# Patient Record
Sex: FEMALE | Race: BLACK OR AFRICAN AMERICAN
[De-identification: names, ages, dates, MRNs, and addresses within clinical notes are randomized per-mention and may not be internally consistent; named-entity substitution may affect disease eponyms.]

---

## 2017-01-07 ENCOUNTER — HOSPITAL ENCOUNTER (EMERGENCY)
Dept: HOSPITAL 17 - NEPB | Age: 22
Discharge: HOME | End: 2017-01-07
Payer: SELF-PAY

## 2017-01-07 VITALS
HEART RATE: 77 BPM | SYSTOLIC BLOOD PRESSURE: 105 MMHG | OXYGEN SATURATION: 100 % | DIASTOLIC BLOOD PRESSURE: 72 MMHG | RESPIRATION RATE: 16 BRPM | TEMPERATURE: 97.9 F

## 2017-01-07 VITALS — HEIGHT: 61 IN | WEIGHT: 194.01 LBS | BODY MASS INDEX: 36.63 KG/M2

## 2017-01-07 DIAGNOSIS — Z72.0: ICD-10-CM

## 2017-01-07 DIAGNOSIS — H60.91: Primary | ICD-10-CM

## 2017-01-07 PROCEDURE — 99282 EMERGENCY DEPT VISIT SF MDM: CPT

## 2017-01-07 NOTE — PD
HPI


Chief Complaint:  ENT Complaint


Time Seen by Provider:  13:47


Travel History


International Travel<30 days:  No


Contact w/Intl Traveler<30days:  No


Traveled to known affect area:  No





History of Present Illness


HPI


Patient comes in complaining of right ear pain ongoing for approximately 2 

weeks.  Describes pain as burning like in nature is worse with palpation or 

pressure.  Patient denies any radiation of the pain.  Patient is using over-the-

counter eardrops as well as an ointment near the opening of the auditory canal 

with minimal to no relief of her symptoms.  Denies any fevers, neck pain, 

difficulty swallowing, or known fevers.





PFSH


Past Medical History


Developmental Delay:  No


Diminished Hearing:  No


Immunizations Current:  Yes


Pregnant?:  Pregnant


:  0





Social History


Alcohol Use:  No


Tobacco Use:  Yes


Substance Use:  No





Allergies-Medications


(Allergen,Severity, Reaction):  


Coded Allergies:  


     *MDRO Multi-Drug Resistant Organism (Verified  Allergy, Unknown, 17)


 MRSA 


Reported Meds & Prescriptions





Reported Meds & Active Scripts


Active


Cortisporin HC Otic Drops (Neomycin-Polymyxin-HC Otic Drops) 3.5-10,000-1 Mg-

Units-% Soln 4 Drop RIGHT EAR QID








Review of Systems


Except as stated in HPI:  all other systems reviewed are Neg





Physical Exam


Narrative


GENERAL: Well-developed, overly nourished, in no acute distress, and non-ill 

appearing.


SKIN: Warm and dry.


HEAD: Atraumatic. Normocephalic. 


EYES: Pupils equal and round. EOMI. No scleral icterus. No injection or 

drainage. 


ENT: No nasal bleeding or discharge.  Mucous membranes pink and moist.  Mild 

edema and erythematous to the right auditory canal.  There is reproducible pain 

with tugging of the right auricle and tragus.  There is no foreign body noted.  

Tympanic membranes are pearly gray bilaterally.


NECK: Trachea midline. No cervical lymphadenopathy. Supple.  No nuclear 

rigidity.


RESPIRATORY: No accessory muscle use.  No respiratory distress. 


MUSCULOSKELETAL: No obvious deformities. No clubbing.  No cyanosis.  No edema.  

Full range of motion.


NEUROLOGICAL: Awake and alert. No obvious cranial nerve deficits.  Motor 

grossly within normal limits. Normal speech.


PSYCHIATRIC: Appropriate mood and affect; insight and judgment normal.





Data


Data


Last Documented VS





Vital Signs








  Date Time  Temp Pulse Resp B/P Pulse Ox O2 Delivery O2 Flow Rate FiO2


 


1/7/17 13:23 97.9 77 16 105/72 100 Room Air  











MDM


Medical Decision Making


Medical Screen Exam Complete:  Yes


Emergency Medical Condition:  Yes


Differential Diagnosis


Otitis media, otitis externa, otalgia, other


Narrative Course


The patient presented with ear pain. History and examination revealed evidence 

of otitis externa. There was no significant swelling of the canal nor 

significant debris. No clinical evidence by history or evaluation to suspect 

meningitis and/or sepsis, nor malignant OE or mastoiditis. I discussed with the 

patient, diagnosis, plan of care and to follow up with the patients primary 

physician within the next week. The patient was discharged on otic antibiotic 

drops. The patient was instructed to not swim or submerge head in bath or shower

, or any other activity that would allow water into canal until cleared by 

their physician. The patient was instructed to return if worsens in anyway, 

especially if increased pain, develop fever, worsening headache, neck pain or 

as needed. The patient agreed with plan.





Patient in no obvious distress upon re-evaluation. Patient was asked if they 

wanted to speak to my attending, which the patient did not wish to do at this 

time.  Any questions/concerns in reference to patient diagnosis/condition 

discussed and clarified prior to patient's discharge. Reinforced sheer 

importance of close follow up with patient's primary physician or primary care 

clinic. Instructed patient to return to ED immediately, if symptoms return/

worsen. Pt showed understanding of above instructions.  Further instructions 

and recommendations were detailed in discharge paperwork.  Pt ambulated without 

difficulty out of ED at discharge.





Diagnosis





 Primary Impression:  


 Otitis externa of right ear


 Qualified Code:  H60.501 - Acute otitis externa of right ear, unspecified type


Patient Instructions:  General Instructions, Otitis Externa (ED)





***Additional Instructions:


Follow-up with your primary care physician in 3-5 days for evaluation.  Take 

all medication as prescribed.  Return to the emergency department if symptoms 

get worse.


***Med/Other Pt SpecificInfo:  Prescription(s) given


Scripts


Neomycin-Polymyxin-HC Otic Drops (Cortisporin HC Otic Drops)3.5-10,000-1 Mg-

Units-% Soln4 Drop RIGHT EAR QID  #1 BOTTLE  Ref 0


   Prov:Maddison Parker MD         17


Disposition:  01 DISCHARGE HOME


Condition:  Stable








Anton Gamboa 2017 13:50

## 2017-03-31 ENCOUNTER — HOSPITAL ENCOUNTER (EMERGENCY)
Dept: HOSPITAL 17 - HOBED | Age: 22
Discharge: HOME | End: 2017-03-31
Payer: MEDICAID

## 2017-03-31 DIAGNOSIS — Z3A.23: ICD-10-CM

## 2017-03-31 DIAGNOSIS — O26.892: Primary | ICD-10-CM

## 2017-03-31 PROCEDURE — 87641 MR-STAPH DNA AMP PROBE: CPT

## 2017-03-31 PROCEDURE — 99284 EMERGENCY DEPT VISIT MOD MDM: CPT

## 2017-03-31 NOTE — PD
HPI


Chief Complaint


Decreased fetal movements


Date Seen:  Mar 31, 2017


Time Seen:  09:25 (Oren Marion MD R1)





Travel History


International Travel<30 Days:  No


Contact w/Intl Traveler<30Days:  No


Known Affected Area:  No (Oren Marion MD R1)





History of Present Illness


HPI


Patient is a 21 year old  at 23/5 weeks gestation presents to OB ED with 

the concern of no fetal movements for the past day and a half. She denies any 

LOF or VB. Denies contractions. Denies urinary symptoms. She has no other 

complaints.


:  2


Miscarriage:  1 (Oren Marion MD R1)


HPI


She reports she is now feeling normal fetal movement. (Madalyn Jasmine MD)





History


Past Medical History


Medical History:  Denies Significant Hx (Oren Marion MD R1)





Obstetric History


Obstetric History





First pregnancy resulting in IUFD at 25/6 weeks gestation induced delivery on  (Oren Marion MD R1)





Past Surgical History


Surgical History:  No Previous Surgery (Oren Marion MD R1)





Family History


Family History:  Negative (Oren Marion MD R1)





Social History


Alcohol Use:  No


Tobacco Use:  No


Substance Abuse:  No (Oren Marion MD R1)





Allergies-Medications


(Allergen,Severity, Reaction):  


Coded Allergies:  


     *MDRO Multi-Drug Resistant Organism (Verified  Allergy, Unknown, 3/31/17)


 MRSA 


Home Meds


Active Scripts


Prenatal Vit W/ Ferric Phospha (Vitafol Gummies 3.33-0.333-34.8 mg)1 Chw Chw1 

Chew PO DAILY  #30 BOTTLE  Ref 11


   Prov:Marleny Mehta CNM ARNP         3/28/17


Metronidazole Vaginal Gel (Metrogel Vaginal Gel)0.75 % Gel1 Appl VAGINAL HS  #1 

TUBE  Ref 0


   Prov:Marleny Mehta CNM ARNP         3/28/17


Discontinued Scripts


Neomycin-Polymyxin-HC Otic Drops (Cortisporin HC Otic Drops)3.5-10,000-1 Mg-

Units-% Soln4 Drop RIGHT EAR QID  #1 BOTTLE  Ref 0


   Prov:Maddison Parker MD         17





Review of Systems


Except as stated in HPI:  all other systems reviewed are Neg (Oren Marion MD R1)





Physical Exam


Narrative


GENERAL: Well-nourished, well-developed patient.


SKIN: Warm and dry.


HEAD: Normocephalic and atraumatic.


EYES: No scleral icterus. No injection or drainage. 


ENT: No nasal drainage noted. Mucous membranes pink. Airway patent.


NECK: Supple, trachea midline. No JVD.


CARDIOVASCULAR: Regular rate and rhythm without murmurs, gallops, or rubs. 


RESPIRATORY: Breath sounds equal bilaterally. No accessory muscle use.


ABDOMEN/GI: Abdomen soft, non-tender, bowel sounds present, no rebound, no 

guarding 


   Gravid to 24 weeks size


GENITOURINARY: 


   External Genitalia: [-]


   Cervix: [-]


   Dilatation: [-]          


   Effacement: [-]          


   Station: [-]  


   Presentation: [-]        


   Membranes: [-]


   Uterine Contractions: none


FHT's: 


   Category: I 


   Baseline: 130s   


   Reactive: yes


   Variability: mod 


   Decels: none


EXTREMITIES: No cyanosis or edema.


BACK: Nontender without obvious deformity. No CVA tenderness.


NEUROLOGICAL: Awake and alert. Motor and sensory grossly within normal limits. 

Normal speech. (Oren Marion MD R1)





Data


Data


Vital Signs Reviewed:  Yes (Oren Marion MD R1)





[facility]


Medical Record Reviewed:  Yes


Plan


Patient is a 21 year old  at 23/5 weeks gestation presented with concern 

of decreased fetal movement.





1. IUP


- Category I fetal tracing, reassuring


- No contractions on tocometer


- Encouraged oral hydration


- Advised to follow up at [facility] clinic for continued prenatal care





wdw OB Hospitalist (Oren Marion MD R1)


Plan


patient evaluated with residents, agree with evaluation and plan. patient 

feeling good FM. fetal movement precautions. PTL precautions.  (Madalyn Jasmine MD)





Diagnosis


Diagnosis:  


 Primary Impression:  


 Intrauterine pregnancy


Disposition:   DISCHARGE HOME


Condition:  Stable


Patient Instructions:  General Instructions, Fetal Movement  (ED)








Oren Marion MD R1 Mar 31, 2017 09:40


Madalyn Jasmine MD Mar 31, 2017 12:04

## 2017-05-04 ENCOUNTER — HOSPITAL ENCOUNTER (EMERGENCY)
Dept: HOSPITAL 17 - HOBED | Age: 22
Discharge: HOME | End: 2017-05-04
Payer: MEDICAID

## 2017-05-04 DIAGNOSIS — Z3A.28: ICD-10-CM

## 2017-05-04 DIAGNOSIS — M54.5: ICD-10-CM

## 2017-05-04 DIAGNOSIS — R14.1: ICD-10-CM

## 2017-05-04 DIAGNOSIS — O99.013: Primary | ICD-10-CM

## 2017-05-04 DIAGNOSIS — R11.0: ICD-10-CM

## 2017-05-04 DIAGNOSIS — D64.9: ICD-10-CM

## 2017-05-04 LAB
COLOR UR: (no result)
COMMENT (UR): (no result)
CULTURE IF INDICATED: (no result)
GLUCOSE UR STRIP-MCNC: (no result) MG/DL
HGB UR QL STRIP: (no result)
KETONES UR STRIP-MCNC: (no result) MG/DL
NITRITE UR QL STRIP: (no result)
SP GR UR STRIP: 1.01 (ref 1–1.03)
SQUAMOUS #/AREA URNS HPF: 1 /HPF (ref 0–5)

## 2017-05-04 PROCEDURE — 81001 URINALYSIS AUTO W/SCOPE: CPT

## 2017-05-04 PROCEDURE — 99284 EMERGENCY DEPT VISIT MOD MDM: CPT

## 2017-05-04 NOTE — PD
HPI


Chief Complaint


abdominal pain


Date Seen:  May 4, 2017


Travel History


International Travel<30 Days:  No


Contact w/Intl Traveler<30Days:  No





History of Present Illness


HPI


Ms. Jasmine is a 21 yo  patient of [facility] at 28 4/7 weeks who 

presents with abdominal cramping. 


Patient reports that she began having abdominal pain last night at 

approximately midnight; patient states that this is a diffuse/steady pain, 6-7/

10 in severity, which is cramping in nature. Patient reports that this pain is 

worse in her left lower quadrant. Patient denies fever or chills. She reports 

some nausea this morning which is improved; no vomiting. 2 soft, normal BM this 

morning without presence of blood. Patient also reports cramping pain with 

urination. Patient reports lower back pain. Patient states that she has not 

eaten since the onset of her pain. Patient reports mild headache. She does not 

report chest pain, shortness of breath, leg swelling, or other symptoms. 

Patient states that her pain is gassy in nature. Patient does not report 

vaginal bleeding or loss of vaginal fluid. Patient does not report decreased 

fetal movement.


Patient sees [facility] for prenatal care. Patient states that she has had 

reassuring labs/ultrasound. Patient reports history of anemia for which she 

takes iron supplementation.


:  2


Miscarriage:  1





History


Past Medical History


*** Narrative Medical


Anemia





Obstetric History


Obstetric History





fetal demise at ~20 weeks per patient





Past Surgical History


Surgical History:  No Previous Surgery





Family History


*** Narrative Family History


Sister- DM





Social History


Alcohol Use:  No


Tobacco Use:  No


Substance Abuse:  No





Allergies-Medications


(Allergen,Severity, Reaction):  


Coded Allergies:  


     *MDRO Multi-Drug Resistant Organism (Verified  Allergy, Unknown, 17)


 MRSA 


Home Meds


Active Scripts


Multi-Vit/Iron-Folic Acid-B12-Vit C (Ferralet)90-1-0.012-120 mg Tab1 Tab PO 

DAILY  #60 BOTTLE  Ref 5


   Prov:Marleny Mehta CNM         17


Prenatal Vit W/ Ferric Phospha (Vitafol Gummies 3.33-0.333-34.8 mg)1 Chw Chw1 

Chew PO DAILY  #30 BOTTLE  Ref 11


   Prov:Marleny Mehta CNM         3/28/17





Review of Systems


General / Constitutional:  No: Fever


Eyes:  No: Blurred Vision


HENT:  No: Headaches


Cardiovascular:  No: Chest Pain or Discomfort


Respiratory:  No: Short of Breath


Gastrointestinal:  Nausea, Abdominal Pain


Genitourinary:  Dysuria,  No: Urgency





Physical Exam


HR 81


/67


Narrative


GENERAL: Well-nourished, well-developed patient.


SKIN: Warm and dry.


HEAD: Normocephalic and atraumatic.


EYES: No scleral icterus. No injection or drainage. 


ENT: No nasal drainage noted. Mucous membranes pink. Airway patent.


NECK: Supple, trachea midline. No JVD.


CARDIOVASCULAR: Regular rate and rhythm without murmurs, gallops, or rubs. 


RESPIRATORY: Breath sounds equal bilaterally. No accessory muscle use.


EXTREMITIES: No cyanosis or edema.


BACK: No CVA tenderness.


NEUROLOGICAL: Awake and alert. Motor and sensory function grossly within normal 

limits. 


ABDOMEN/GI: Abdomen soft, non-tender, bowel sounds present, no rebound, no 

guarding 


   Gravid


GENITOURINARY: 


   External Genitalia: intact and normal in appearance


   Cervix: 


   Dilatation: 0         


   Effacement: 0      


   Station: -3 


   Membranes: Intact


   Uterine Contractions: None


FHT's: 


   Category: 1   


   Baseline: 130  


   Reactive: Y   


   Variability: Mod


   Decels: None





Data


Data


Orders





 Vital Signs (Adult) .ON ADMISSION (17 11:18)


^ Labor Status (17 11:18)


Urinalysis - C+S If Indicated (17 11:18)


Ondansetron  Odt (Zofran  Odt) (17 11:30)


Simethicone Chew (Mylicon Chew) (17 11:30)





MDM


Medical Record Reviewed:  Yes


Narrative Course / MDM


21 yo  patient of [facility] at 28 4/7 weeks 


-Abdominal cramping; high suspicion for GI related 


-Dysuria


-Cat 1 rhythm


-No contractions on CTG


-Cervix closed





Plan: 


-Will give simethicone


-Reassured patient regarding nature of pain; advised bland diet


-F/U at [facility] routinely


Diagnosis


Diagnosis:  


 Primary Impression:  


 Abdominal gas pain


 Additional Impression:  


 28 weeks gestation of pregnancy


Disposition:  01 DISCHARGE HOME


Condition:  Stable


Patient Instructions:  Abdominal Pain in Pregnancy  (ED), General Instructions








Ean Collier MD R2 May 4, 2017 11:48

## 2017-06-19 ENCOUNTER — HOSPITAL ENCOUNTER (EMERGENCY)
Dept: HOSPITAL 17 - HOBED | Age: 22
Discharge: HOME | End: 2017-06-19
Payer: MEDICAID

## 2017-06-19 DIAGNOSIS — R10.30: ICD-10-CM

## 2017-06-19 DIAGNOSIS — O23.13: ICD-10-CM

## 2017-06-19 DIAGNOSIS — Z3A.35: ICD-10-CM

## 2017-06-19 DIAGNOSIS — O26.893: Primary | ICD-10-CM

## 2017-06-19 LAB
BACTERIA #/AREA URNS HPF: (no result) /HPF
COLOR UR: YELLOW
COMMENT (UR): (no result)
CULTURE IF INDICATED: (no result)
GLUCOSE UR STRIP-MCNC: (no result) MG/DL
HGB UR QL STRIP: (no result)
KETONES UR STRIP-MCNC: (no result) MG/DL
MUCOUS THREADS #/AREA URNS LPF: (no result) /LPF
NITRITE UR QL STRIP: (no result)
SP GR UR STRIP: 1.02 (ref 1–1.03)
SQUAMOUS #/AREA URNS HPF: 2 /HPF (ref 0–5)

## 2017-06-19 PROCEDURE — 87086 URINE CULTURE/COLONY COUNT: CPT

## 2017-06-19 PROCEDURE — 76816 OB US FOLLOW-UP PER FETUS: CPT

## 2017-06-19 PROCEDURE — 81001 URINALYSIS AUTO W/SCOPE: CPT

## 2017-06-19 PROCEDURE — 76819 FETAL BIOPHYS PROFIL W/O NST: CPT

## 2017-06-19 PROCEDURE — 59025 FETAL NON-STRESS TEST: CPT

## 2017-06-19 NOTE — PD
HPI


Chief Complaint


Contractions


Date Seen:  2017 (Natasha Fitzpatrick MD R2)





Travel History


International Travel<30 Days:  No


Contact w/Intl Traveler<30Days:  No (Natasha Fitzpatrick MD R2)





History of Present Illness


HPI


Patient is 22 year old  at 35-1/7 weeks gestation who presents today for 

contractions.  Contractions and cramping started last night and are occurring 

every 10 minutes.  She denies any vaginal bleeding or discharge. No gush or 

leaking of fluid. Positive fetal movement. Prenatal care with [facility]. (

Natasha Fitzpatrick MD R2)





History


Past Medical History


Medical History:  Denies Significant Hx (Natasha Fitzpatrick MD R2)





Obstetric History


Obstetric History





fetal demise at 25 weeks gestation (Natasha Fitzpatrick MD R2)





Past Surgical History


Surgical History:  No Previous Surgery (Natasha Fitzpatrick MD R2)





Family History


Family History:  Negative (Natasha Fitzpatrick MD R2)





Social History


Alcohol Use:  No


Tobacco Use:  No


Substance Abuse:  No (Natasha Fitzpatrick MD R2)





Allergies-Medications


(Allergen,Severity, Reaction):  


Coded Allergies:  


     *MDRO Multi-Drug Resistant Organism (Verified  Allergy, Unknown, 17)


 MRSA 


Home Meds


Active Scripts


Nitrofurantoin Macrocrystal 100 Mg [id number] Mg PO BID  #14 CAP  Ref 0


   Prov:Natasha Fitzpatrick MD R2         17


Cephalexin (Keflex)250 Mg [id number] Mg PO TID  #21 CAP  Ref 0


   Prov:Madalyn Mcclellan         17


Multi-Vit/Iron-Folic Acid-B12-Vit C (Ferralet)90-1-0.012-120 mg Tab1 Tab PO 

DAILY  #60 BOTTLE  Ref 5


   Prov:Marleny Mehta CNM         17


Prenatal Vit W/ Ferric Phospha (Vitafol Gummies 3.33-0.333-34.8 mg)1 Chw Chw1 

Chew PO DAILY  #30 BOTTLE  Ref 11


   Prov:Marleny Mehta CNM         3/28/17


Discontinued Scripts


Cefadroxil Liq 250 Mg/5 Ml [id number] Mg PO TID  #105 ML  Ref 0


   Prov:Madalyn Mcclellan         17





Review of Systems


Except as stated in HPI:  all other systems reviewed are Neg


General / Constitutional:  No: Fever, Chills


Eyes:  No: Visual changes


HENT:  No: Headaches


Respiratory:  No: Short of Breath


Gastrointestinal:  Abdominal Pain


Genitourinary:  Pelvic Pain,  No: Dysuria, Discharge, Vaginal Bleeding


Musculoskeletal:  No: Edema


Neurologic:  No: Headache


Psychiatric:  No: Substance Abuse (Natasha Fitzpatrick MD R2)





Physical Exam


Narrative


GENERAL: Well-nourished, well-developed patient.


SKIN: Warm and dry.


HEAD: Normocephalic and atraumatic.


EYES: No scleral icterus. No injection or drainage. 


ENT: No nasal drainage noted. Mucous membranes pink. Airway patent.


NECK: Supple, trachea midline. No JVD.


CARDIOVASCULAR: Regular rate and rhythm without murmurs, gallops, or rubs. 


RESPIRATORY: Breath sounds equal bilaterally. No accessory muscle use.


ABDOMEN/GI: Abdomen soft, non-tender, bowel sounds present, no rebound, no 

guarding 


   Gravid to 35 weeks size


GENITOURINARY: 


   External Genitalia: intact and normal in appearance


   BUS glands: normal


   Cervix: posterior


   Dilatation: 3-4          


   Effacement: 50          


   Station: -2  


   Presentation: vertex        


   Membranes: intact


   Uterine Contractions: q3-5min


FHT's: 


   Category: I   


   Baseline: 130   


   Reactive: +   


   Variability: moderate  


   Decels: none  


EXTREMITIES: No cyanosis or edema.


BACK: Nontender without obvious deformity. No CVA tenderness.


NEUROLOGICAL: Awake and alert. Motor and sensory grossly within normal limits. 

Normal speech. (Natasha Fitzpatrick MD R2)





Data


Data


Vital Signs Reviewed:  Yes


Orders





 Vital Signs (Adult) .ON ADMISSION (17 09:59)


^ Labor Status (17 09:59)


^ Non Stress Test (17 09:59)


^ Hydration (17 09:59)


Urinalysis - C+S If Indicated (17 09:59) (Natasha Fitzpatrick MD R2)





MDM


Medical Record Reviewed:  Yes


Narrative Course / MDM


22 year old  at 35-1/7 weeks gestation.





1. IUP- Category I tracing, reassuring.


2. Contractions- Possible  labor, will continue to monitor for cervical 

change. IV LR. 





sdw Dr. Haddox





Addendum:


Variable fetal heart rate deceleration for 45 seconds down to the 60's was 

noted. Otherwise Category I tracing. BPP 8/8. Patient was kept for another 2 

hours of fetal heart tracing and tracing was reassuring with continued Category 

I tracing. 


UA significant for moderate bacteria and leukocyte esterace. Will treat acute 

cystitis with Macrobid 100mg PO BID x 7 days.


Patient is discharge to home. Follow-up with [facility]. (Natasha Fitzpatrick MD R2)





Diagnosis


Diagnosis:  


 Primary Impression:  


  contractions


 Additional Impression:  


 Acute cystitis during pregnancy in third trimester


Disposition:  01 DISCHARGE HOME


Condition:  Stable


Scripts


Nitrofurantoin Macrocrystal 100 Mg [id number] Mg PO BID  #14 CAP  Ref 0


   Prov:Natasha Fitzpatrick MD R2         17





Collaborating MD Comments


Patient seen and examined multiple times today.  Discussed care with patient 

and agree with discharge given the reassuring status of the baby and 8/8 BPP (

Jeannette Winchester MD)








Natasha Fitzpatrick MD R2 2017 10:14


Jeannette Winchester MD 2017 08:29

## 2017-07-06 ENCOUNTER — HOSPITAL ENCOUNTER (INPATIENT)
Dept: HOSPITAL 17 - HOBED | Age: 22
LOS: 3 days | Discharge: HOME | End: 2017-07-09
Attending: OBSTETRICS & GYNECOLOGY | Admitting: OBSTETRICS & GYNECOLOGY
Payer: MEDICAID

## 2017-07-06 VITALS — HEART RATE: 81 BPM | DIASTOLIC BLOOD PRESSURE: 76 MMHG | SYSTOLIC BLOOD PRESSURE: 126 MMHG

## 2017-07-06 VITALS — DIASTOLIC BLOOD PRESSURE: 58 MMHG | HEART RATE: 81 BPM | OXYGEN SATURATION: 100 % | SYSTOLIC BLOOD PRESSURE: 124 MMHG

## 2017-07-06 VITALS — HEART RATE: 74 BPM | SYSTOLIC BLOOD PRESSURE: 140 MMHG | DIASTOLIC BLOOD PRESSURE: 82 MMHG

## 2017-07-06 VITALS — HEART RATE: 97 BPM

## 2017-07-06 VITALS — HEART RATE: 102 BPM | SYSTOLIC BLOOD PRESSURE: 135 MMHG | DIASTOLIC BLOOD PRESSURE: 103 MMHG

## 2017-07-06 VITALS — HEART RATE: 98 BPM | SYSTOLIC BLOOD PRESSURE: 129 MMHG | DIASTOLIC BLOOD PRESSURE: 72 MMHG

## 2017-07-06 VITALS — SYSTOLIC BLOOD PRESSURE: 127 MMHG | HEART RATE: 90 BPM | DIASTOLIC BLOOD PRESSURE: 68 MMHG

## 2017-07-06 VITALS — HEART RATE: 72 BPM | OXYGEN SATURATION: 94 %

## 2017-07-06 VITALS — DIASTOLIC BLOOD PRESSURE: 83 MMHG | HEART RATE: 99 BPM | SYSTOLIC BLOOD PRESSURE: 133 MMHG

## 2017-07-06 VITALS — DIASTOLIC BLOOD PRESSURE: 92 MMHG | HEART RATE: 98 BPM | SYSTOLIC BLOOD PRESSURE: 134 MMHG

## 2017-07-06 VITALS — HEART RATE: 87 BPM

## 2017-07-06 VITALS — SYSTOLIC BLOOD PRESSURE: 109 MMHG | HEART RATE: 97 BPM | DIASTOLIC BLOOD PRESSURE: 41 MMHG

## 2017-07-06 VITALS — RESPIRATION RATE: 16 BRPM | DIASTOLIC BLOOD PRESSURE: 84 MMHG | SYSTOLIC BLOOD PRESSURE: 132 MMHG | HEART RATE: 83 BPM

## 2017-07-06 VITALS — DIASTOLIC BLOOD PRESSURE: 68 MMHG | SYSTOLIC BLOOD PRESSURE: 103 MMHG | HEART RATE: 130 BPM

## 2017-07-06 VITALS — SYSTOLIC BLOOD PRESSURE: 132 MMHG | DIASTOLIC BLOOD PRESSURE: 95 MMHG | HEART RATE: 99 BPM

## 2017-07-06 VITALS — HEART RATE: 77 BPM

## 2017-07-06 VITALS — SYSTOLIC BLOOD PRESSURE: 117 MMHG | DIASTOLIC BLOOD PRESSURE: 72 MMHG | HEART RATE: 84 BPM

## 2017-07-06 VITALS — SYSTOLIC BLOOD PRESSURE: 124 MMHG | DIASTOLIC BLOOD PRESSURE: 74 MMHG | HEART RATE: 73 BPM

## 2017-07-06 VITALS — HEART RATE: 76 BPM | DIASTOLIC BLOOD PRESSURE: 67 MMHG | SYSTOLIC BLOOD PRESSURE: 134 MMHG

## 2017-07-06 VITALS — RESPIRATION RATE: 16 BRPM | HEART RATE: 49 BPM

## 2017-07-06 VITALS — HEART RATE: 86 BPM

## 2017-07-06 VITALS — DIASTOLIC BLOOD PRESSURE: 73 MMHG | SYSTOLIC BLOOD PRESSURE: 116 MMHG | HEART RATE: 75 BPM

## 2017-07-06 VITALS — HEART RATE: 98 BPM

## 2017-07-06 VITALS — DIASTOLIC BLOOD PRESSURE: 64 MMHG | OXYGEN SATURATION: 100 % | SYSTOLIC BLOOD PRESSURE: 114 MMHG | HEART RATE: 86 BPM

## 2017-07-06 VITALS — RESPIRATION RATE: 18 BRPM | TEMPERATURE: 98 F

## 2017-07-06 VITALS — HEART RATE: 95 BPM | SYSTOLIC BLOOD PRESSURE: 131 MMHG | DIASTOLIC BLOOD PRESSURE: 62 MMHG

## 2017-07-06 VITALS — RESPIRATION RATE: 18 BRPM

## 2017-07-06 VITALS — HEART RATE: 92 BPM

## 2017-07-06 VITALS — RESPIRATION RATE: 16 BRPM

## 2017-07-06 VITALS — RESPIRATION RATE: 16 BRPM | HEART RATE: 81 BPM | DIASTOLIC BLOOD PRESSURE: 74 MMHG | SYSTOLIC BLOOD PRESSURE: 121 MMHG

## 2017-07-06 VITALS — OXYGEN SATURATION: 100 % | HEART RATE: 86 BPM

## 2017-07-06 VITALS — TEMPERATURE: 98.3 F | HEART RATE: 84 BPM | OXYGEN SATURATION: 100 %

## 2017-07-06 VITALS — HEART RATE: 89 BPM | OXYGEN SATURATION: 100 %

## 2017-07-06 VITALS — HEART RATE: 90 BPM

## 2017-07-06 VITALS — SYSTOLIC BLOOD PRESSURE: 138 MMHG | DIASTOLIC BLOOD PRESSURE: 73 MMHG | HEART RATE: 98 BPM

## 2017-07-06 VITALS — OXYGEN SATURATION: 100 % | HEART RATE: 88 BPM

## 2017-07-06 VITALS — HEART RATE: 79 BPM | SYSTOLIC BLOOD PRESSURE: 128 MMHG | DIASTOLIC BLOOD PRESSURE: 69 MMHG

## 2017-07-06 VITALS — HEART RATE: 76 BPM

## 2017-07-06 VITALS — HEART RATE: 89 BPM

## 2017-07-06 VITALS — DIASTOLIC BLOOD PRESSURE: 61 MMHG | HEART RATE: 96 BPM | SYSTOLIC BLOOD PRESSURE: 131 MMHG

## 2017-07-06 VITALS — HEART RATE: 81 BPM | OXYGEN SATURATION: 100 %

## 2017-07-06 VITALS — HEART RATE: 93 BPM

## 2017-07-06 DIAGNOSIS — Z3A.37: ICD-10-CM

## 2017-07-06 DIAGNOSIS — R68.84: ICD-10-CM

## 2017-07-06 DIAGNOSIS — M54.5: ICD-10-CM

## 2017-07-06 DIAGNOSIS — O32.6XX0: ICD-10-CM

## 2017-07-06 LAB
BACTERIA #/AREA URNS HPF: (no result) /HPF
BASOPHILS # BLD AUTO: 0 TH/MM3 (ref 0–0.2)
BASOPHILS NFR BLD: 0.2 % (ref 0–2)
COLOR UR: YELLOW
COMMENT (UR): (no result)
CULTURE IF INDICATED: (no result)
EOSINOPHIL # BLD: 0.3 TH/MM3 (ref 0–0.4)
EOSINOPHIL NFR BLD: 2.7 % (ref 0–4)
ERYTHROCYTE [DISTWIDTH] IN BLOOD BY AUTOMATED COUNT: 15.1 % (ref 11.6–17.2)
GLUCOSE UR STRIP-MCNC: (no result) MG/DL
HCT VFR BLD CALC: 36.3 % (ref 35–46)
HEMO FLAGS: (no result)
HGB UR QL STRIP: (no result)
KETONES UR STRIP-MCNC: (no result) MG/DL
LYMPHOCYTES # BLD AUTO: 2.4 TH/MM3 (ref 1–4.8)
LYMPHOCYTES NFR BLD AUTO: 23.3 % (ref 9–44)
MCH RBC QN AUTO: 23.4 PG (ref 27–34)
MCHC RBC AUTO-ENTMCNC: 32.4 % (ref 32–36)
MCV RBC AUTO: 72.4 FL (ref 80–100)
MONOCYTES NFR BLD: 8.7 % (ref 0–8)
MUCOUS THREADS #/AREA URNS LPF: (no result) /LPF
NEUTROPHILS # BLD AUTO: 6.7 TH/MM3 (ref 1.8–7.7)
NEUTROPHILS NFR BLD AUTO: 65.1 % (ref 16–70)
NITRITE UR QL STRIP: (no result)
PLATELET # BLD: 229 TH/MM3 (ref 150–450)
RBC # BLD AUTO: 5.02 MIL/MM3 (ref 4–5.3)
SP GR UR STRIP: 1.02 (ref 1–1.03)
WBC # BLD AUTO: 10.2 TH/MM3 (ref 4–11)

## 2017-07-06 PROCEDURE — 86900 BLOOD TYPING SEROLOGIC ABO: CPT

## 2017-07-06 PROCEDURE — 90707 MMR VACCINE SC: CPT

## 2017-07-06 PROCEDURE — 80307 DRUG TEST PRSMV CHEM ANLYZR: CPT

## 2017-07-06 PROCEDURE — 90715 TDAP VACCINE 7 YRS/> IM: CPT

## 2017-07-06 PROCEDURE — G0481 DRUG TEST DEF 8-14 CLASSES: HCPCS

## 2017-07-06 PROCEDURE — 3E0S3CZ: ICD-10-PCS

## 2017-07-06 PROCEDURE — 86901 BLOOD TYPING SEROLOGIC RH(D): CPT

## 2017-07-06 PROCEDURE — 85025 COMPLETE CBC W/AUTO DIFF WBC: CPT

## 2017-07-06 PROCEDURE — 85461 HEMOGLOBIN FETAL: CPT

## 2017-07-06 PROCEDURE — 99285 EMERGENCY DEPT VISIT HI MDM: CPT

## 2017-07-06 PROCEDURE — 88307 TISSUE EXAM BY PATHOLOGIST: CPT

## 2017-07-06 PROCEDURE — 86850 RBC ANTIBODY SCREEN: CPT

## 2017-07-06 PROCEDURE — 10907ZC DRAINAGE OF AMNIOTIC FLUID, THERAPEUTIC FROM PRODUCTS OF CONCEPTION, VIA NATURAL OR ARTIFICIAL OPENING: ICD-10-PCS | Performed by: OBSTETRICS & GYNECOLOGY

## 2017-07-06 PROCEDURE — 90384 RH IG FULL-DOSE IM: CPT

## 2017-07-06 PROCEDURE — 81001 URINALYSIS AUTO W/SCOPE: CPT

## 2017-07-06 PROCEDURE — 00HU33Z INSERTION OF INFUSION DEVICE INTO SPINAL CANAL, PERCUTANEOUS APPROACH: ICD-10-PCS

## 2017-07-06 RX ADMIN — OXYTOCIN SCH MLS/HR: 10 INJECTION, SOLUTION INTRAMUSCULAR; INTRAVENOUS at 23:30

## 2017-07-06 RX ADMIN — OXYTOCIN SCH MLS/HR: 10 INJECTION, SOLUTION INTRAMUSCULAR; INTRAVENOUS at 15:37

## 2017-07-06 NOTE — PD.LABORPN
Subjective


Subjective


Patient resting comfortably in bed with epidural in place.





Objective


Vital Signs





 Vital Signs








  Date Time  Temp Pulse Resp B/P Pulse Ox O2 Delivery O2 Flow Rate FiO2


 


17 20:36  74  140/82    


 


17 20:30   18     


 


17 20:12  99  132/95    


 


17 19:00   18     


 


17 18:36  84  117/72    


 


17 18:35  102  135/103    


 


17 18:30   18     


 


17 18:30 98.0       


 


17 15:02  73  124/74    


 


17 15:00   18     


 


17 15:00 98.0       








Objective


Pelvic Exam:   


   Cervix: midposition


   Dilatation: 8          


   Effacement: 100          


   Station: -1  


   Presentation: vertex        


   Membranes: AROM, meconium


   Uterine Contractions: q3-4min


FHT's: 


   Category: I   


   Baseline: 120   


   Reactive: +   


   Variability: moderate  


   Decels: none





Assessment/Plan


Assessment and Plan


22 year old  at 37-4/7 weeks gestation.





1. IUP- Category I tracing, reassuring.


2. Labor- cervical change noted, AROM with meconium fluid


3. GBS negative


4. Anticipate 





sdw Dr. Farfan and Natasha Patterson MD R2 2017 22:17

## 2017-07-06 NOTE — PD.LABORPN
Subjective


Subjective


Is 37-38 weeks  who came in abeba this morning at the 10:00, 

membranes intact no bleeding, fetal heart rate tracing is reactive, the time 

was abeba more that she is now.  Contractions spaced out of she's not 

really in pain.  The patient states she had a contraction  7 minutes ago and 

was uncomfortable.  I just checked patient's cervix she's  4/ 40%/ -3 very high

, and this is not unusual just a typical multiparous cervix in the last 3-4 

weeks of pregnancy without significant effacement I do not believe the patient 

is truly in labor and I offered the patient to go home and come back when 

contractions get stronger, however she did not want to do that ,she will  stay 

here in L&D today  and see what happens.  Since she's not even 38 weeks not 

even term I would just leave her alone not try and augment her at this stage, I 

would just give her time to go into labor if she is to do that today and 

tonight in the morning if she still the same she is going home





Objective


Vital Signs





 Vital Signs








  Date Time  Temp Pulse Resp B/P Pulse Ox O2 Delivery O2 Flow Rate FiO2


 


17 11:30  75  116/73    


 


17 11:00   18     








Objective


Pelvic Exam:   


   Cervix: [-]


   Dilatation: [-]          


   Effacement: [-]          


   Station: [-]  


   Presentation: [-]        


   Membranes: [intact or ruptured]


   Uterine Contractions: [-]


FHT's: 


   Category: [-]   


   Baseline: [-]   


   Reactive: [-]   


   Variability: [-]  


   Decels: [-]








Jose Farfan II, MD 2017 13:12

## 2017-07-06 NOTE — PD
HPI


Chief Complaint


Contractions


Date Seen:  2017


Time Seen:  09:50


Travel History


International Travel<30 Days:  No


Contact w/Intl Traveler<30Days:  No





History of Present Illness


HPI


22-year-old  at 37/4 weeks gestation presenting with contractions. 

Contractions started about 6 AM this morning and were about every 5-6 minutes 

apart. They're very intense, but after about an hour they ceased. Then an hour 

after stopping, contractions resumed and were again intense and 5-6 minutes 

apart. Patient was 3-4 cm dilated a few days ago. She denies vaginal bleeding, 

leakage of fluid/rupture of membranes, and she feels normal fetal movement. 

Denies chest pain, shortness of breath, nausea/vomiting, dysuria.





History


Past Medical History


Medical History:  Denies Significant Hx





Obstetric History


Obstetric History


, previous pregnancy delivered vaginally





Past Surgical History


Surgical History:  No Previous Surgery





Family History


Family History:  Negative





Social History


Alcohol Use:  No


Tobacco Use:  No


Substance Abuse:  No





Allergies-Medications


(Allergen,Severity, Reaction):  


Coded Allergies:  


     *MDRO Multi-Drug Resistant Organism (Verified  Allergy, Unknown, 17)


 MRSA 


Home Meds


Active Scripts


Cephalexin (Keflex)250 Mg Mgq921 Mg PO TID  #21 CAP  Ref 0


   Prov:Chappuis,Madalyn B. ARN         17


Multi-Vit/Iron-Folic Acid-B12-Vit C (Ferralet)90-1-0.012-120 mg Tab1 Tab PO 

DAILY  #60 BOTTLE  Ref 5


   Prov:Marleny Mehta CNM [facility]         17


Prenatal Vit W/ Ferric Phospha (Vitafol Gummies 3.33-0.333-34.8 mg)1 Chw Chw1 

Chew PO DAILY  #30 BOTTLE  Ref 11


   Prov:Marleny Mehta CNM [facility]         3/28/17





Review of Systems


Except as stated in HPI:  all other systems reviewed are Neg





Physical Exam


Narrative


GENERAL: Well-nourished, well-developed patient.


SKIN: Warm and dry.


HEAD: Normocephalic and atraumatic.


EYES: No scleral icterus. No injection or drainage. 


ENT: No nasal drainage noted. Mucous membranes pink. Airway patent.


NECK: Supple, trachea midline. No JVD.


CARDIOVASCULAR: Regular rate and rhythm without murmurs, gallops, or rubs. 


RESPIRATORY: Breath sounds equal bilaterally. No accessory muscle use.


BREASTS: Bilateral exam showed no masses , no retractions, no nipple discharge.


ABDOMEN/GI: Abdomen soft, non-tender, bowel sounds present, no rebound, no 

guarding 


GENITOURINARY: 


   Cervix: midposition


   Dilatation: 4-5 cm          


   Effacement: 70%          


   Station: -1  


   Presentation: Vertex        


   Membranes: Intact


   Uterine Contractions: Felt every 4-5 mins, not detected on external monitor


FHT's: 


   Category: 1   


   Baseline: 115   


   Reactive: Y   


   Variability: moderate  


   Decels: N  


EXTREMITIES: No cyanosis or edema.


BACK: Nontender without obvious deformity. No CVA tenderness.


NEUROLOGICAL: Awake and alert. Motor and sensory grossly within normal limits. 

Normal speech.





Data


Data


Vital Signs Reviewed:  Yes


Orders





 Admit To Inpatient (17 )


Vital Signs (Adult) .Per protocol (17 09:57)


Fetal Heart (17 09:57)


Amnioinfusion (17 09:57)


Urinary Catheter Management .ONCE (17 09:57)


Diet Liquid (17 Breakfast)


Lactated Ringer's 1000 Ml Inj (Lr 1000 M (17 09:57)


Lactated Ringer's 1000 Ml Inj (Lr 1000 M (17 09:57)


Sodium Chlorid 0.9% 500 Ml Inj (Ns 500 M (17 10:00)


Sodium Chlor 0.9% 1000 Ml Inj (Ns 1000 M (17 10:17)


Lidocaine 1% Inj (50 Ml) (Xylocaine 1% I (17 10:00)


Citric Acid-Sodium Citrate Liq (Bicitra (17 10:00)


Fentanyl Inj (Fentanyl Inj) (17 10:00)


Fentanyl Inj (Fentanyl Inj) (17 10:00)


Complete Blood Count With Diff (17 09:57)


Hold Clot (17 09:57)


Abo/Rh Blood Type (17 09:57)


Urinalysis - C+S If Indicated (17 09:57)


Resp Oxygen Non Rebreathe Mask (17 )


^ Epidural / Intrathecal Infus (17 09:57)


Oxytocin 30 Units-500ml Premix (Pitocin (17 10:00)


Lidocaine 1% Inj (50 Ml) (Xylocaine 1% I (17 10:00)


Light Mineral Oil (Muri-Lube Oil) (17 10:00)


Inpatient Certification (17 )





MDM


Medical Record Reviewed:  Yes


Narrative Course / MDM


22-year-old  at 37/4 weeks gestation presenting in active labor





#1 IUP


Category 1 tracing, reassuring


- Continuous fetal monitoring





#2 GBS negative


No need for intrapartum antibiotic prophylaxis





#3 labor


Has not ruptured membranes at. Making cervical change.


- Admit to labor and delivery


- Routine antepartum care


- Epidural if patient desires


Diagnosis


Diagnosis:  


 Primary Impression:  


 Normal labor








Charles Jauregui MD R1 2017 10:35

## 2017-07-06 NOTE — HHI.HP
History & Physical


H&P


 HPI 


HPI


Chief Complaint


Contractions


Date Seen:  2017


Time Seen:  09:50


Travel History


International Travel<30 Days:  No


Contact w/Intl Traveler<30Days:  No





History of Present Illness


HPI


22-year-old  at 37/4 weeks gestation presenting with contractions. 

Contractions started about 6 AM this morning and were about every 5-6 minutes 

apart. They're very intense, but after about an hour they ceased. Then an hour 

after stopping, contractions resumed and were again intense and 5-6 minutes 

apart. Patient was 3-4 cm dilated a few days ago. She denies vaginal bleeding, 

leakage of fluid/rupture of membranes, and she feels normal fetal movement. 

Denies chest pain, shortness of breath, nausea/vomiting, dysuria.





 History (Limited) 


History


Past Medical History


Medical History:  Denies Significant Hx





Obstetric History


Obstetric History


, previous pregnancy delivered vaginally





Past Surgical History


Surgical History:  No Previous Surgery





Family History


Family History:  Negative





Social History


Alcohol Use:  No


Tobacco Use:  No


Substance Abuse:  No





 Allergies-Medications 


Allergies-Medications


(Allergen,Severity, Reaction):  


Coded Allergies:  


     *MDRO Multi-Drug Resistant Organism (Verified  Allergy, Unknown, 17)


 MRSA 


Home Meds


Active Scripts


Cephalexin (Keflex)250 Mg Xrt473 Mg PO TID  #21 CAP  Ref 0


   Prov:Madalyn Mcclellan         17


Multi-Vit/Iron-Folic Acid-B12-Vit C (Ferralet)90-1-0.012-120 mg Tab1 Tab PO 

DAILY  #60 BOTTLE  Ref 5


   Prov:Marleny Mehta CNM         17


Prenatal Vit W/ Ferric Phospha (Vitafol Gummies 3.33-0.333-34.8 mg)1 Chw Chw1 

Chew PO DAILY  #30 BOTTLE  Ref 11


   Prov:Marleny Mehta CNM [facility]         3/28/17





 ROS 


Review of Systems


Except as stated in HPI:  all other systems reviewed are Neg





 Physical Exam 


Physical Exam


Narrative


GENERAL: Well-nourished, well-developed patient.


SKIN: Warm and dry.


HEAD: Normocephalic and atraumatic.


EYES: No scleral icterus. No injection or drainage. 


ENT: No nasal drainage noted. Mucous membranes pink. Airway patent.


NECK: Supple, trachea midline. No JVD.


CARDIOVASCULAR: Regular rate and rhythm without murmurs, gallops, or rubs. 


RESPIRATORY: Breath sounds equal bilaterally. No accessory muscle use.


BREASTS: Bilateral exam showed no masses , no retractions, no nipple discharge.


ABDOMEN/GI: Abdomen soft, non-tender, bowel sounds present, no rebound, no 

guarding 


GENITOURINARY: 


   Cervix: midposition


   Dilatation: 4-5 cm          


   Effacement: 70%          


   Station: -1  


   Presentation: Vertex        


   Membranes: Intact


   Uterine Contractions: Felt every 4-5 mins, not detected on external monitor


FHT's: 


   Category: 1   


   Baseline: 115   


   Reactive: Y   


   Variability: moderate  


   Decels: N  


EXTREMITIES: No cyanosis or edema.


BACK: Nontender without obvious deformity. No CVA tenderness.


NEUROLOGICAL: Awake and alert. Motor and sensory grossly within normal limits. 

Normal speech.





 Data 


Vital Signs Reviewed:  Yes


Orders





 Admit To Inpatient (17 )


Vital Signs (Adult) .Per protocol (17 09:57)


Fetal Heart (17 09:57)


Amnioinfusion (17 09:57)


Urinary Catheter Management .ONCE (17 09:57)


Diet Liquid (17 Breakfast)


Lactated Ringer's 1000 Ml Inj (Lr 1000 M (17 09:57)


Lactated Ringer's 1000 Ml Inj (Lr 1000 M (17 09:57)


Sodium Chlorid 0.9% 500 Ml Inj (Ns 500 M (17 10:00)


Sodium Chlor 0.9% 1000 Ml Inj (Ns 1000 M (17 10:17)


Lidocaine 1% Inj (50 Ml) (Xylocaine 1% I (17 10:00)


Citric Acid-Sodium Citrate Liq (Bicitra (17 10:00)


Fentanyl Inj (Fentanyl Inj) (17 10:00)


Fentanyl Inj (Fentanyl Inj) (17 10:00)


Complete Blood Count With Diff (17 09:57)


Hold Clot (17 09:57)


Abo/Rh Blood Type (17 09:57)


Urinalysis - C+S If Indicated (17 09:57)


Resp Oxygen Non Rebreathe Mask (17 )


^ Epidural / Intrathecal Infus (17 09:57)


Oxytocin 30 Units-500ml Premix (Pitocin (17 10:00)


Lidocaine 1% Inj (50 Ml) (Xylocaine 1% I (17 10:00)


Light Mineral Oil (Muri-Lube Oil) (17 10:00)


Inpatient Certification (17 )





 MDM 


MDM


Medical Record Reviewed:  Yes


Narrative Course / MDM


22-year-old  at 37/4 weeks gestation presenting in active labor





#1 IUP


Category 1 tracing, reassuring


- Continuous fetal monitoring





#2 GBS negative


No need for intrapartum antibiotic prophylaxis





#3 labor


Has not ruptured membranes at. Making cervical change.


- Admit to labor and delivery


- Routine antepartum care


- Epidural if patient desires


Diagnosis


Diagnosis:  


 Primary Impression:  


 Normal labor








Charles Jauregui MD R1 2017 10:36

## 2017-07-07 VITALS — SYSTOLIC BLOOD PRESSURE: 126 MMHG | DIASTOLIC BLOOD PRESSURE: 71 MMHG | HEART RATE: 102 BPM

## 2017-07-07 VITALS
DIASTOLIC BLOOD PRESSURE: 77 MMHG | SYSTOLIC BLOOD PRESSURE: 125 MMHG | TEMPERATURE: 98.4 F | HEART RATE: 96 BPM | RESPIRATION RATE: 14 BRPM

## 2017-07-07 VITALS — SYSTOLIC BLOOD PRESSURE: 137 MMHG | DIASTOLIC BLOOD PRESSURE: 76 MMHG | HEART RATE: 100 BPM

## 2017-07-07 VITALS — TEMPERATURE: 98.7 F | RESPIRATION RATE: 18 BRPM

## 2017-07-07 VITALS — HEART RATE: 103 BPM | SYSTOLIC BLOOD PRESSURE: 125 MMHG | DIASTOLIC BLOOD PRESSURE: 66 MMHG

## 2017-07-07 VITALS — HEART RATE: 91 BPM

## 2017-07-07 VITALS — HEART RATE: 92 BPM | SYSTOLIC BLOOD PRESSURE: 112 MMHG | DIASTOLIC BLOOD PRESSURE: 62 MMHG

## 2017-07-07 VITALS — HEART RATE: 129 BPM | RESPIRATION RATE: 18 BRPM

## 2017-07-07 VITALS — RESPIRATION RATE: 16 BRPM

## 2017-07-07 VITALS — DIASTOLIC BLOOD PRESSURE: 67 MMHG | SYSTOLIC BLOOD PRESSURE: 124 MMHG | HEART RATE: 104 BPM | RESPIRATION RATE: 16 BRPM

## 2017-07-07 VITALS — HEART RATE: 100 BPM

## 2017-07-07 VITALS — SYSTOLIC BLOOD PRESSURE: 126 MMHG | DIASTOLIC BLOOD PRESSURE: 69 MMHG | HEART RATE: 100 BPM

## 2017-07-07 VITALS — HEART RATE: 114 BPM | DIASTOLIC BLOOD PRESSURE: 64 MMHG | SYSTOLIC BLOOD PRESSURE: 133 MMHG

## 2017-07-07 VITALS — HEART RATE: 101 BPM | RESPIRATION RATE: 18 BRPM | OXYGEN SATURATION: 100 %

## 2017-07-07 VITALS — HEART RATE: 98 BPM

## 2017-07-07 VITALS — DIASTOLIC BLOOD PRESSURE: 76 MMHG | HEART RATE: 108 BPM | SYSTOLIC BLOOD PRESSURE: 139 MMHG

## 2017-07-07 VITALS — SYSTOLIC BLOOD PRESSURE: 146 MMHG | DIASTOLIC BLOOD PRESSURE: 87 MMHG | HEART RATE: 116 BPM

## 2017-07-07 VITALS — HEART RATE: 107 BPM | RESPIRATION RATE: 18 BRPM

## 2017-07-07 VITALS — HEART RATE: 87 BPM

## 2017-07-07 VITALS — SYSTOLIC BLOOD PRESSURE: 113 MMHG | HEART RATE: 113 BPM | DIASTOLIC BLOOD PRESSURE: 94 MMHG

## 2017-07-07 VITALS — HEART RATE: 104 BPM

## 2017-07-07 VITALS — TEMPERATURE: 98.3 F | RESPIRATION RATE: 18 BRPM

## 2017-07-07 VITALS — HEART RATE: 112 BPM

## 2017-07-07 VITALS — SYSTOLIC BLOOD PRESSURE: 90 MMHG | DIASTOLIC BLOOD PRESSURE: 59 MMHG | HEART RATE: 98 BPM

## 2017-07-07 VITALS — SYSTOLIC BLOOD PRESSURE: 114 MMHG | TEMPERATURE: 97.6 F | DIASTOLIC BLOOD PRESSURE: 79 MMHG

## 2017-07-07 VITALS — SYSTOLIC BLOOD PRESSURE: 123 MMHG | HEART RATE: 98 BPM | DIASTOLIC BLOOD PRESSURE: 72 MMHG

## 2017-07-07 VITALS — RESPIRATION RATE: 18 BRPM

## 2017-07-07 VITALS — HEART RATE: 87 BPM | RESPIRATION RATE: 16 BRPM

## 2017-07-07 VITALS — HEART RATE: 97 BPM

## 2017-07-07 VITALS — HEART RATE: 102 BPM

## 2017-07-07 VITALS — OXYGEN SATURATION: 100 % | HEART RATE: 108 BPM

## 2017-07-07 LAB
AMPHETAMINE, URINE: (no result)
BARBITURATES, URINE: (no result)
COCAINE UR-MCNC: (no result) NG/ML

## 2017-07-07 RX ADMIN — IBUPROFEN PRN MG: 600 TABLET, FILM COATED ORAL at 14:40

## 2017-07-07 RX ADMIN — IBUPROFEN PRN MG: 600 TABLET, FILM COATED ORAL at 21:41

## 2017-07-07 RX ADMIN — ACETAMINOPHEN PRN MG: 325 TABLET ORAL at 14:38

## 2017-07-07 RX ADMIN — IBUPROFEN PRN MG: 600 TABLET, FILM COATED ORAL at 07:59

## 2017-07-07 RX ADMIN — ACETAMINOPHEN PRN MG: 325 TABLET ORAL at 08:00

## 2017-07-07 RX ADMIN — OXYCODONE HYDROCHLORIDE AND ACETAMINOPHEN PRN TAB: 5; 325 TABLET ORAL at 21:42

## 2017-07-07 NOTE — HHI.OB
Subjective


Post Partum Day:  0


Remarks


22 year old female  s/p  at 37/4 wks gestation and is PPD0. AFVSS 

postpartum. Patient reports she is feeling well. Bleeding is decreasing and 

pain is well-controlled. She is breast feeding and bonding well with baby. 

Ambulating without difficulties. She is tolerating a diet without nausea or 

vomiting. She has not had a bowel movement. She has passed gas. Denies chest 

pain, dysuria, shortness of breath, or calf pain.





Objective


Vitals/I&O





Vital Signs








  Date Time  Temp Pulse Resp B/P Pulse Ox O2 Delivery O2 Flow Rate FiO2


 


17 08:30  87 16     


 


17 08:00 97.6   114/79    


 


17 04:05 98.4       


 


17 04:05    125/77    


 


17 04:05  96 14     


 


17 03:18  102  126/71    


 


17 03:16 98.3  18     


 


17 02:45   16     


 


17 02:43  100  126/69    


 


17 02:31  113  113/94    


 


17 02:30   16     


 


17 02:18  98  90/59    


 


17 01:50   18     


 


17 01:50 98.7       


 


17 01:46  108  139/76    


 


17 01:35   18     


 


17 01:30  116  146/87    


 


17 01:25  108      


 


17 01:25     100   


 


17 01:20  101      


 


17 01:20     100   


 


17 01:20   18     


 


17 01:15  112  133/64    


 


17 01:15  114      


 


17 01:05   18     


 


17 01:05  129      


 


17 01:00  101      


 


17 01:00  98  123/72    


 


17 00:55  102      


 


17 00:50  107      


 


17 00:50   18     


 


17 00:45  105      


 


17 00:45  100  137/76    


 


17 00:40  97      


 


17 00:35  104      


 


17 00:30   16     


 


17 00:30  92  124/67    


 


17 00:30  104      


 


17 00:25  91      


 


17 00:20  100      


 


17 00:15  101      


 


17 00:15  103      


 


17 00:15    125/66    


 


17 00:10  98      


 


17 00:05  112      


 


17 00:01  92  112/62    


 


17 00:00  87      


 


17 23:55  98      


 


17 23:55  98      


 


17 23:50  92      


 


17 23:50  92      


 


17 23:45  101  133/83    


 


17 23:45  99      


 


17 23:45    133/83    


 


17 23:45  99      


 


17 23:40  87      


 


17 23:40  87      


 


17 23:36   18     


 


17 23:35  89   100   


 


17 23:30  83      


 


17 23:30  81  124/58 100   


 


17 23:25  87      


 


17 23:20  90      


 


17 23:15  96  128/69    


 


17 23:15  79      


 


17 23:13   18     


 


17 23:10  92      


 


17 23:05  86      


 


17 23:00  81      


 


17 23:00   16     


 


17 23:00  101  121/74    


 


17 22:55     100   


 


17 22:55  81      


 


17 22:50     94   


 


17 22:50  72      


 


17 22:45     100   


 


17 22:45  86  114/64    


 


17 22:45  78      


 


17 22:40  88      


 


17 22:40     100   


 


17 22:35     100   


 


17 22:35  86      


 


17 22:31  97  109/41    


 


17 22:30  84      


 


17 22:30     100   


 


17 22:30 98.3       


 


17 22:25  87      


 


17 22:25  87      


 


17 22:20  87      


 


17 22:20  87      


 


17 22:17   18     


 


17 22:16  130  103/68    


 


17 22:15  89      


 


17 22:10  97      


 


17 22:05  76      


 


17 22:00  83      


 


17 22:00   16     


 


17 22:00  76  132/84    


 


17 21:56  76  134/67    


 


17 21:55  77      


 


17 21:50  96  131/61    


 


17 21:50  95      


 


17 21:45  88      


 


17 21:45  95  131/62    


 


17 21:40  90  127/68    


 


17 21:40  81      


 


17 21:37  81  126/76    


 


17 21:35  98  129/72    


 


17 21:35  79      


 


17 21:31  98  134/92    


 


17 21:30  49      


 


17 21:30   16     


 


17 21:25  93      


 


17 21:20  98  138/73    


 


17 21:15   18     


 


17 21:00   16     


 


17 20:36  74  140/82    


 


17 20:30   18     


 


17 20:12  99  132/95    


 


17 19:00   18     


 


17 18:36  84  117/72    


 


17 18:35  102  135/103    


 


17 18:30   18     


 


17 18:30 98.0       


 


17 15:02  73  124/74    


 


17 15:00   18     


 


17 15:00 98.0       


 


17 11:30  75  116/73    








Objective Remarks


GENERAL: WDWN resting comfortably in bed.


CARDIOVASCULAR: Regular rate and rhythm without murmurs, gallops, or rubs. 


RESPIRATORY: Breath sounds equal bilaterally w/no increased WOB. No accessory 

muscle use.


ABDOMEN/GI: Abdomen soft, appropriately tender. 


   Fundus: Firm, non-tender at umbilicus.


GENITOURINARY: Light to moderate bleeding.


EXTREMITIES: No cyanosis or edema, non-tender, without signs of DVT.


Medications and IVs





 Current Medications








 Medications


  (Trade)  Dose


 Ordered  Sig/Olga


 Route  Start Time


 Stop Time Status Last Admin


 


  (NS Flush)  2 ml  BID


 IV FLUSH  17 09:00


     


 


 


  (NS Flush)  2 ml  UNSCH  PRN


 IV FLUSH  17 02:00


     


 


 


  (Tylenol)  650 mg  Q4H  PRN


 PO  17 02:00


    17 08:00


 


 


  (Motrin)  600 mg  Q6H  PRN


 PO  17 02:00


    17 07:59


 


 


  (Percocet  5-325


 Mg)  1 tab  Q4H  PRN


 PO  17 02:00


     


 


 


  (Percocet  5-325


 Mg)  2 tab  Q4H  PRN


 PO  17 02:00


     


 


 


  (Americaine 20%


 Top Spr)  1 spray  Q4H  PRN


 TOPICAL  17 02:00


     


 


 


  (Tucks Pads)  1 applic  QID  PRN


 TOPICAL  17 02:00


     


 


 


  (Jessica-Colace)  2 tab  Q12H  PRN


 PO  17 02:00


     


 


 


  (Ambien)  5 mg  HS  PRN


 PO  17 02:00


     


 


 


  (M-M-R Ii Inj)  0.5 ml  ONCE ONCE


 SQ  17 16:00


 17 16:01   


 


 


  (Boostrix Inj)  0.5 ml  ONCE ONCE


 IM  17 16:00


 17 16:01   


 


 


  (Mag-Al Plus


 Susp Liq)  15 ml  Q8H  PRN


 PO  17 02:00


     


 


 


  (Zofran  Odt)  4 mg  Q6H  PRN


 PO  17 02:00


     


 











Assessment/Plan


Assessment and Plan


21 yo  female s/p  at 37/4 and is POD0. 


- AFVSS postpartum


- Continue routine postpartum care 


- Motrin and Percocet PRN pain 


- Encourage OOB 


- Pelvic rest x 6 wks


- Contraception: TBD


- Anticipate D/C  or 





Dispo: home


Discharge Planning


home








Dane Mi MD R1 2017 11:25


Dane Mi MD R1 2017 11:25

## 2017-07-07 NOTE — PD.OB.DELI
Delivery Date:  2017


Anesthesia:  Epidural


Episiotomy:  None


Vaginal Delivery:  Normal


Presentation:  Occiput anterior, Compound (compound right hand )


Nuchal Cord:  None


Delayed cord clamping (45 sec):  No


Infant:  Female


One Minute APGAR:  9


Five Minute APGAR:  9


Birth Weight:  2205 g


Placenta:  Spontaneous delivery, Intact, 3 vessel cord


Laceration:  1 deg (right periuretral region )


Additional Information


Estimated blood loss 250 cc, supervised by Dr. Farfan and Maura Dubois MD 2017 01:53

## 2017-07-07 NOTE — HHI.DCPOC
Discharge Care Plan


Diagnosis:  


(1) Normal vaginal delivery


Report Symptoms to Your Doctor


-Temperature above 100.5 degrees


-Redness, of incision or excessive or foul smelling drainage


-Unusual pain or calf pain


-Increased vaginal bleeding


-Painful or difficulty urinating


-Feelings of extreme sadness or anxiety after 2 weeks


Goals to Promote Your Health


* To prevent worsening of your condition and complications


* To maintain your health at the optimal level


Directions to Meet Your Goals


*** Take your medications as prescribed


*** Follow your dietary instruction


*** Follow activity as directed


*** Ensure plenty of rest for recovery


*** Drink fluids for hydration








*** Keep your appointments as scheduled


*** Take your immunizations and boosters as scheduled


*** If your symptoms worsen call your PCP, if no PCP go to Urgent Care Center 

or Emergency Room***


*** Smoking is Dangerous to Your Health. Avoid second hand smoke***


***Call the 24-hour crisis hotline for domestic abuse at 1-427.868.7290***








Dane Mi MD R1 Jul 7, 2017 14:04

## 2017-07-08 VITALS
TEMPERATURE: 97.9 F | HEART RATE: 75 BPM | RESPIRATION RATE: 18 BRPM | SYSTOLIC BLOOD PRESSURE: 113 MMHG | DIASTOLIC BLOOD PRESSURE: 71 MMHG

## 2017-07-08 VITALS — RESPIRATION RATE: 18 BRPM

## 2017-07-08 RX ADMIN — OXYCODONE HYDROCHLORIDE AND ACETAMINOPHEN PRN TAB: 5; 325 TABLET ORAL at 04:11

## 2017-07-08 RX ADMIN — IBUPROFEN PRN MG: 600 TABLET, FILM COATED ORAL at 04:11

## 2017-07-08 RX ADMIN — OXYCODONE HYDROCHLORIDE AND ACETAMINOPHEN PRN TAB: 5; 325 TABLET ORAL at 23:18

## 2017-07-08 RX ADMIN — IBUPROFEN PRN MG: 600 TABLET, FILM COATED ORAL at 17:59

## 2017-07-08 RX ADMIN — IBUPROFEN PRN MG: 600 TABLET, FILM COATED ORAL at 12:30

## 2017-07-08 RX ADMIN — OXYCODONE HYDROCHLORIDE AND ACETAMINOPHEN PRN TAB: 5; 325 TABLET ORAL at 12:30

## 2017-07-08 RX ADMIN — OXYCODONE HYDROCHLORIDE AND ACETAMINOPHEN PRN TAB: 5; 325 TABLET ORAL at 17:59

## 2017-07-08 RX ADMIN — IBUPROFEN PRN MG: 600 TABLET, FILM COATED ORAL at 23:18

## 2017-07-08 RX ADMIN — OXYCODONE HYDROCHLORIDE AND ACETAMINOPHEN PRN TAB: 5; 325 TABLET ORAL at 08:22

## 2017-07-08 NOTE — HHI.OB
Subjective


Post Partum Day:  1


Remarks


Pt seen and examined this morning. Postpartum day # 1 AFVSS overnight.  

Decreased lochia. Denies dysuria. No breast tenderness. She is feeding the baby 

via breast and bottle. Appetite good. No nausea or vomiting. Patient endorses 

no bowel movement, but has positive bowel sounds.  Ambulating well. Denies calf 

pain or shortness of breath. Otherwise, she is doing well this morning and has 

no other concerns.





Objective


Vitals/I&O





Vital Signs








  Date Time  Temp Pulse Resp B/P Pulse Ox O2 Delivery O2 Flow Rate FiO2


 


17 08:20  75  113/71    


 


17 08:20 97.9  18     








Objective Remarks


GENERAL: WDWN resting comfortably in bed.


CARDIOVASCULAR: Regular rate and rhythm without murmurs, gallops, or rubs. 


RESPIRATORY: Breath sounds equal bilaterally w/no increased WOB. No accessory 

muscle use.


ABDOMEN/GI: Abdomen soft, appropriately tender. 


   Fundus: Firm, non-tender at umbilicus.


GENITOURINARY: Light to moderate bleeding.


EXTREMITIES: No cyanosis or edema, non-tender, without signs of DVT.


Medications and IVs





 Current Medications








 Medications


  (Trade)  Dose


 Ordered  Sig/Olga


 Route  Start Time


 Stop Time Status Last Admin


 


  (NS Flush)  2 ml  BID


 IV FLUSH  17 09:00


     


 


 


  (NS Flush)  2 ml  UNSCH  PRN


 IV FLUSH  17 02:00


     


 


 


  (Tylenol)  650 mg  Q4H  PRN


 PO  17 02:00


    17 14:38


 


 


  (Motrin)  600 mg  Q6H  PRN


 PO  17 02:00


    17 04:11


 


 


  (Percocet  5-325


 Mg)  1 tab  Q4H  PRN


 PO  17 02:00


    17 08:22


 


 


  (Percocet  5-325


 Mg)  2 tab  Q4H  PRN


 PO  17 02:00


     


 


 


  (Americaine 20%


 Top Spr)  1 spray  Q4H  PRN


 TOPICAL  17 02:00


     


 


 


  (Tucks Pads)  1 applic  QID  PRN


 TOPICAL  17 02:00


     


 


 


  (Jessica-Colace)  2 tab  Q12H  PRN


 PO  17 02:00


     


 


 


  (Ambien)  5 mg  HS  PRN


 PO  17 02:00


     


 


 


  (Mag-Al Plus


 Susp Liq)  15 ml  Q8H  PRN


 PO  17 02:00


     


 


 


  (Zofran  Odt)  4 mg  Q6H  PRN


 PO  17 02:00


     


 











Assessment/Plan


Assessment and Plan


23y/o female who is postpartum day # 1 s/p . 


-Continue routine postpartum care.


-Motrin PRN pain.


-Encouraged OOB. Advised pelvic rest for 6 wks. 


-Re: birth ctrl, she would like to discuss her options at her follow-up OB 

appointment.


-Anticipate discharge tomorrow, . 


tova Jasmine MD


Discharge Planning


Likely home tomorrow, /








Pa Jordan MD R1 2017 10:33

## 2017-07-09 VITALS
HEART RATE: 81 BPM | RESPIRATION RATE: 18 BRPM | SYSTOLIC BLOOD PRESSURE: 142 MMHG | DIASTOLIC BLOOD PRESSURE: 87 MMHG | TEMPERATURE: 98.4 F

## 2017-07-09 RX ADMIN — IBUPROFEN PRN MG: 600 TABLET, FILM COATED ORAL at 11:40

## 2017-07-09 RX ADMIN — IBUPROFEN PRN MG: 600 TABLET, FILM COATED ORAL at 05:42

## 2017-07-09 RX ADMIN — OXYCODONE HYDROCHLORIDE AND ACETAMINOPHEN PRN TAB: 5; 325 TABLET ORAL at 05:41

## 2017-07-09 NOTE — HHI.OB
Subjective


Post Partum Day:  2


Remarks


Pt seen and examined this morning. Postpartum day # 2 AFVSS overnight.  

Decreased lochia. Denies dysuria. No breast tenderness. She is feeding the baby 

via bottle. Appetite good. No nausea or vomiting. Patient endorses bowel 

movement and passing gas. Ambulating well. Denies calf pain or shortness of 

breath. Currently she states that she is having tenderness in lower back pain 

radiating down both legs related to her epidural placement as well as jaw pain 

related to possible dental abscess per patient report.





Objective


Vitals/I&O





Vital Signs








  Date Time  Temp Pulse Resp B/P Pulse Ox O2 Delivery O2 Flow Rate FiO2


 


17 14:55   18     


 


17 12:30   18     








Objective Remarks


GENERAL: WDWN resting comfortably in bed.


CARDIOVASCULAR: Regular rate and rhythm without murmurs, gallops, or rubs. 


RESPIRATORY: Breath sounds equal bilaterally w/no increased WOB. No accessory 

muscle use.


ABDOMEN/GI: Abdomen soft, appropriately tender. 


   Fundus: Firm, non-tender at umbilicus.


GENITOURINARY: Light to moderate bleeding.


EXTREMITIES: No cyanosis or edema, non-tender, without signs of DVT.


Medications and IVs





 Current Medications








 Medications


  (Trade)  Dose


 Ordered  Sig/Olga


 Route  Start Time


 Stop Time Status Last Admin


 


  (NS Flush)  2 ml  BID


 IV FLUSH  17 09:00


     


 


 


  (NS Flush)  2 ml  UNSCH  PRN


 IV FLUSH  17 02:00


     


 


 


  (Tylenol)  650 mg  Q4H  PRN


 PO  17 02:00


    17 14:38


 


 


  (Motrin)  600 mg  Q6H  PRN


 PO  17 02:00


    17 05:42


 


 


  (Percocet  5-325


 Mg)  1 tab  Q4H  PRN


 PO  17 02:00


    17 05:41


 


 


  (Percocet  5-325


 Mg)  2 tab  Q4H  PRN


 PO  17 02:00


     


 


 


  (Americaine 20%


 Top Spr)  1 spray  Q4H  PRN


 TOPICAL  17 02:00


     


 


 


  (Tucks Pads)  1 applic  QID  PRN


 TOPICAL  17 02:00


     


 


 


  (Jessica-Colace)  2 tab  Q12H  PRN


 PO  17 02:00


     


 


 


  (Ambien)  5 mg  HS  PRN


 PO  17 02:00


     


 


 


  (Mag-Al Plus


 Susp Liq)  15 ml  Q8H  PRN


 PO  17 02:00


     


 


 


  (Zofran  Odt)  4 mg  Q6H  PRN


 PO  17 02:00


     


 











Assessment/Plan


Assessment and Plan


21y/o female who is postpartum day # 2 s/p . 


-Continue routine postpartum care.


-Motrin PRN pain.


-When discussing pain management patient becomes agitated and demands Percocet 

for pain control upon discharge. OB team explained benefits of hydration and 

pain control with Motrin, however patient still demands Percocet and threatens 

to perfuse discharge upon discussion.


-Encouraged OOB. Advised pelvic rest for 6 wks. 


-Re: birth ctrl, she would like to discuss her options at her follow-up OB 

appointment.


-Anticipate discharge today, 


tova Alvarado and Dr. Singh, MDs


Discharge Planning


Patient to be discharged home today, 








Pa Jordan MD R1 2017 10:53

## 2017-10-02 ENCOUNTER — HOSPITAL ENCOUNTER (EMERGENCY)
Dept: HOSPITAL 17 - NEPK | Age: 22
Discharge: HOME | End: 2017-10-02
Payer: SELF-PAY

## 2017-10-02 VITALS
OXYGEN SATURATION: 99 % | DIASTOLIC BLOOD PRESSURE: 68 MMHG | RESPIRATION RATE: 16 BRPM | TEMPERATURE: 98.4 F | HEART RATE: 92 BPM | SYSTOLIC BLOOD PRESSURE: 136 MMHG

## 2017-10-02 DIAGNOSIS — K02.9: Primary | ICD-10-CM

## 2017-10-02 PROCEDURE — 99284 EMERGENCY DEPT VISIT MOD MDM: CPT

## 2017-10-02 NOTE — PD
HPI


Chief Complaint:  Oral / Dental Pain or Problem


Time Seen by Provider:  18:34


Travel History


International Travel<30 days:  No


Contact w/Intl Traveler<30days:  No


Traveled to known affect area:  No





History of Present Illness


HPI


22-year-old female here for evaluation of dental pain.  Symptoms started 3 

weeks ago.  Throbbing pain, constant, localized to the right mandibular first 

molar.  She reports that she's been using over-the-counter Advil and ibuprofen 

which is helped somewhat the pain but it has persisted which prompted 

evaluation.  She endorses tobacco use.  Denies fevers, chills.  She has no 

other complaints at this time.





PFSH


Past Medical History


Developmental Delay:  No


Diminished Hearing:  No


Immunizations Current:  Yes


:  0





Social History


Alcohol Use:  No


Tobacco Use:  No


Substance Use:  No





Allergies-Medications


(Allergen,Severity, Reaction):  


Coded Allergies:  


     *MDRO Multi-Drug Resistant Organism (Verified  Allergy, Unknown, 17)


 MRSA 


Reported Meds & Prescriptions





Reported Meds & Active Scripts


Active


Ibuprofen 800 Mg Tab 800 Mg PO Q6HR PRN


Magic Mouthwash Adult Liq (Multi-Ingredient Mouthwash/Gargle) 120 Ml Susp 10 Ml 

SWISH-SPIT ACHS


     Each 5mL contains: Nystatin 200,000units,


     Diphenhydramine 4.25mg, Viscous Lidocaine 10mg,


     Cherry syrup 0.8 mL


Penicillin V Potassium 500 Mg Tab 500 Mg PO Q8H 7 Days


Xulane 150/35 Patch 7 DAY (Norelgestromin-Ethinyl Estradiol Patch 7 DAY) 150-35 

MCG/24Hr Patch 1 Patch TOPICAL Q7D


     A new patch is applied each week for 3 weeks (21 total days). Week 4


     is patch-free.








Review of Systems


General / Constitutional:  No: Fever, Chills


HENT:  Positive: Dental Difficulties


Respiratory:  No: Cough





Physical Exam


Narrative


GENERAL: Well-developed well-nourished female in no acute distress


SKIN: Warm and dry.


HEAD: Atraumatic. Normocephalic. 


EYES: Pupils equal and round. No scleral icterus. No injection or drainage. 


ENT: No nasal bleeding or discharge.  Mucous membranes pink and moist.  Dental 

decay localized to the right mandibular first molar which is tender to 

palpation.  There is no gingival edema, no facial edema, no sublingual edema, 

no trismus.


NECK: Trachea midline. No JVD.  No lymphadenopathy or submandibular edema.


CARDIOVASCULAR: Regular rate and rhythm.  No murmur appreciated.


RESPIRATORY: No accessory muscle use. Clear to auscultation. Breath sounds 

equal bilaterally.





MDM


Medical Decision Making


Medical Screen Exam Complete:  Yes


Emergency Medical Condition:  Yes


Medical Record Reviewed:  Yes


Differential Diagnosis


Dental caries, pulpitis, pericarditis, periodontal abscess


Narrative Course


Examination is consistent with dental caries.  The patient will be discharged 

with nonnarcotic analgesics and a short course of penicillin.  Recommend follow-

up with a dentist for definitive therapy.





Diagnosis





 Primary Impression:  


 Dental caries





***Additional Instructions:  


Medication as prescribed.  Avoid tobacco products.  Follow-up with a dentist 

for definitive therapy.  Return for any emergent medical conditions.


***Med/Other Pt SpecificInfo:  Prescription(s) given


Scripts


Ibuprofen (Ibuprofen) 800 Mg Tab


800 MG PO Q6HR Y for PAIN, #40 TAB 0 Refills


   Prov: Richie James MD         10/2/17 


Nystatin-Diphenhydramine-Lidocaine Liq (Magic Mouthwash Adult Liq) 120 Ml Susp


10 ML SWISH-SPIT ACHS for Mouth sores, #120 ML 1 Refill


   Each 5mL contains: Nystatin 200,000units,


   Diphenhydramine 4.25mg, Viscous Lidocaine 10mg,


   Cherry syrup 0.8 mL


   Prov: Richie James MD         10/2/17 


Penicillin V Potassium (Penicillin V Potassium) 500 Mg Tab


500 MG PO Q8H for Infection for 7 Days, #21 TAB 0 Refills


   Prov: Richie James MD         10/2/17


Disposition:  01 DISCHARGE HOME


Condition:  Stable











Bhargav Holt Oct 2, 2017 18:39

## 2017-10-02 NOTE — PD
Physical Exam


Time Seen by Provider:  18:21


Narrative


22 year old female presents to the ED for evaluation of R sided dental pain x 3 

weeks. No trauma. no fever or chills. No other symptoms. No significant medical 

history.





Data


Data


Last Documented VS





Vital Signs








  Date Time  Temp Pulse Resp B/P (MAP) Pulse Ox O2 Delivery O2 Flow Rate FiO2


 


10/2/17 18:45        


 


10/2/17 18:45 98.4 92 16  99   











MDM


Medical Record Reviewed:  Yes


Supervised Visit with ADRIÁN:  No


Scripts


Ibuprofen (Ibuprofen) 800 Mg Tab


800 MG PO Q6HR Y for PAIN, #40 TAB 0 Refills


   Prov: Richie James MD         10/2/17 


Nystatin-Diphenhydramine-Lidocaine Liq (Magic Mouthwash Adult Liq) 120 Ml Susp


10 ML SWISH-SPIT ACHS for Mouth sores, #120 ML 1 Refill


   Each 5mL contains: Nystatin 200,000units,


   Diphenhydramine 4.25mg, Viscous Lidocaine 10mg,


   Cherry syrup 0.8 mL


   Prov: Richie James MD         10/2/17 


Penicillin V Potassium (Penicillin V Potassium) 500 Mg Tab


500 MG PO Q8H for Infection for 7 Days, #21 TAB 0 Refills


   Prov: Richie James MD         10/2/17


Condition:  Stable











Mali Palacios Oct 2, 2017 18:21

## 2017-11-12 ENCOUNTER — HOSPITAL ENCOUNTER (EMERGENCY)
Dept: HOSPITAL 17 - NEPD | Age: 22
LOS: 1 days | Discharge: HOME | End: 2017-11-13
Payer: SELF-PAY

## 2017-11-12 VITALS
DIASTOLIC BLOOD PRESSURE: 66 MMHG | SYSTOLIC BLOOD PRESSURE: 145 MMHG | OXYGEN SATURATION: 100 % | TEMPERATURE: 98.3 F | HEART RATE: 83 BPM | RESPIRATION RATE: 16 BRPM

## 2017-11-12 VITALS — HEIGHT: 61 IN | BODY MASS INDEX: 35.38 KG/M2 | WEIGHT: 187.39 LBS

## 2017-11-12 VITALS — OXYGEN SATURATION: 99 % | RESPIRATION RATE: 16 BRPM

## 2017-11-12 DIAGNOSIS — F17.200: ICD-10-CM

## 2017-11-12 DIAGNOSIS — Z3A.09: ICD-10-CM

## 2017-11-12 DIAGNOSIS — O99.331: ICD-10-CM

## 2017-11-12 DIAGNOSIS — R82.71: ICD-10-CM

## 2017-11-12 DIAGNOSIS — O26.891: Primary | ICD-10-CM

## 2017-11-12 DIAGNOSIS — K08.89: ICD-10-CM

## 2017-11-12 DIAGNOSIS — R10.2: ICD-10-CM

## 2017-11-12 LAB
ALP SERPL-CCNC: 86 U/L (ref 45–117)
ALT SERPL-CCNC: 18 U/L (ref 10–53)
ANION GAP SERPL CALC-SCNC: 6 MEQ/L (ref 5–15)
APTT BLD: 29.7 SEC (ref 24.3–30.1)
AST SERPL-CCNC: 11 U/L (ref 15–37)
BACTERIA #/AREA URNS HPF: (no result) /HPF
BASOPHILS # BLD AUTO: 0.1 TH/MM3 (ref 0–0.2)
BASOPHILS NFR BLD: 0.6 % (ref 0–2)
BETA HCG QUANT: (no result) MIU/ML (ref 0–5)
BILIRUB SERPL-MCNC: 0.1 MG/DL (ref 0.2–1)
BUN SERPL-MCNC: 7 MG/DL (ref 7–18)
CHLORIDE SERPL-SCNC: 104 MEQ/L (ref 98–107)
COLOR UR: YELLOW
COMMENT (UR): (no result)
CULTURE IF INDICATED: (no result)
EOSINOPHIL # BLD: 0.2 TH/MM3 (ref 0–0.4)
EOSINOPHIL NFR BLD: 2.4 % (ref 0–4)
ERYTHROCYTE [DISTWIDTH] IN BLOOD BY AUTOMATED COUNT: 14 % (ref 11.6–17.2)
GFR SERPLBLD BASED ON 1.73 SQ M-ARVRAT: 117 ML/MIN (ref 89–?)
GLUCOSE UR STRIP-MCNC: (no result) MG/DL
HCO3 BLD-SCNC: 25.6 MEQ/L (ref 21–32)
HCT VFR BLD CALC: 34.3 % (ref 35–46)
HEMO FLAGS: (no result)
HGB UR QL STRIP: (no result)
INR PPP: 1 RATIO
KETONES UR STRIP-MCNC: (no result) MG/DL
LYMPHOCYTES # BLD AUTO: 3.5 TH/MM3 (ref 1–4.8)
LYMPHOCYTES NFR BLD AUTO: 40.2 % (ref 9–44)
MCH RBC QN AUTO: 24 PG (ref 27–34)
MCHC RBC AUTO-ENTMCNC: 32.9 % (ref 32–36)
MCV RBC AUTO: 73 FL (ref 80–100)
MONOCYTES NFR BLD: 10.8 % (ref 0–8)
MUCOUS THREADS #/AREA URNS LPF: (no result) /LPF
NEUTROPHILS # BLD AUTO: 4 TH/MM3 (ref 1.8–7.7)
NEUTROPHILS NFR BLD AUTO: 46 % (ref 16–70)
NITRITE UR QL STRIP: (no result)
PLATELET # BLD: 282 TH/MM3 (ref 150–450)
POTASSIUM SERPL-SCNC: 3.7 MEQ/L (ref 3.5–5.1)
PROTHROMBIN TIME: 10.6 SEC (ref 9.8–11.6)
RBC # BLD AUTO: 4.7 MIL/MM3 (ref 4–5.3)
SODIUM SERPL-SCNC: 136 MEQ/L (ref 136–145)
SP GR UR STRIP: 1.02 (ref 1–1.03)
SQUAMOUS #/AREA URNS HPF: 3 /HPF (ref 0–5)
WBC # BLD AUTO: 8.6 TH/MM3 (ref 4–11)

## 2017-11-12 PROCEDURE — 84703 CHORIONIC GONADOTROPIN ASSAY: CPT

## 2017-11-12 PROCEDURE — 80053 COMPREHEN METABOLIC PANEL: CPT

## 2017-11-12 PROCEDURE — 85730 THROMBOPLASTIN TIME PARTIAL: CPT

## 2017-11-12 PROCEDURE — 83690 ASSAY OF LIPASE: CPT

## 2017-11-12 PROCEDURE — 76700 US EXAM ABDOM COMPLETE: CPT

## 2017-11-12 PROCEDURE — 84702 CHORIONIC GONADOTROPIN TEST: CPT

## 2017-11-12 PROCEDURE — 64400 NJX AA&/STRD TRIGEMINAL NRV: CPT

## 2017-11-12 PROCEDURE — 85610 PROTHROMBIN TIME: CPT

## 2017-11-12 PROCEDURE — 87086 URINE CULTURE/COLONY COUNT: CPT

## 2017-11-12 PROCEDURE — 81001 URINALYSIS AUTO W/SCOPE: CPT

## 2017-11-12 PROCEDURE — 85025 COMPLETE CBC W/AUTO DIFF WBC: CPT

## 2017-11-12 NOTE — PD
HPI


Chief Complaint:  Abdominal Pain


Time Seen by Provider:  21:52


Travel History


International Travel<30 days:  No


Contact w/Intl Traveler<30days:  No


Traveled to known affect area:  No





History of Present Illness


HPI


22-year-old female here for evaluation of dental pain and abdominal cramping.  

Patient reports having right lower molar dental pain for over a month.  She 

states she was seen in the emergency department last month for the same, 

without improvement in pain.  She has been taking ibuprofen and aspirin for her 

pain, and for the last couple of days has noted intermittent diffuse abdominal 

cramping.  She also tells me that her last menstrual period was a month and a 

half ago and she believes she may be pregnant.  She denies vaginal bleeding or 

discharge.  No fevers or chills.  No nausea or vomiting.  No diarrhea.  No 

urinary symptoms.





PFSH


Past Medical History


Medical History:  Denies Significant Hx


Developmental Delay:  No


Diminished Hearing:  No


Immunizations Current:  Yes


Tetanus Vaccination:  < 5 Years


Pregnant?:  Unknown


LMP:  10/15/2017


:  0





Past Surgical History


Surgical History:  No Previous Surgery





Social History


Alcohol Use:  No


Tobacco Use:  Yes (black n milds)


Substance Use:  No





Allergies-Medications


(Allergen,Severity, Reaction):  


Coded Allergies:  


     *MDRO Multi-Drug Resistant Organism (Verified  Allergy, Unknown, 17)


 MRSA 


Reported Meds & Prescriptions





Reported Meds & Active Scripts


Active


Keflex (Cephalexin) 500 Mg Cap 500 Mg PO Q12H 10 Days








Review of Systems


Except as stated in HPI:  all other systems reviewed are Neg





Physical Exam


Narrative


GENERAL: Well-developed, well-nourished, comfortable, no apparent distress.


SKIN: Focused skin assessment warm/dry.


HEAD: Atraumatic. Normocephalic. 


EYES: Pupils equal and round. No scleral icterus. No injection or drainage. 


ENT: No nasal bleeding or discharge.  Mucous membranes pink and moist.  Poor 

dentition with several dental caries, no fluctuance or induration.  No 

sublingual swelling.  No submental swelling or induration.  No drooling or 

stridor.  Normal phonation.


NECK: Trachea midline. No JVD. 


CARDIOVASCULAR: Regular rate and rhythm.  


RESPIRATORY: No accessory muscle use. Clear to auscultation. Breath sounds 

equal bilaterally. 


GASTROINTESTINAL: Abdomen soft, nondistended.  Mild diffuse tenderness without 

peritoneal signs.  Normal bowel sounds.  No hernias.


MUSCULOSKELETAL: No obvious deformities. No clubbing.  No cyanosis.  No edema. 


NEUROLOGICAL: Awake and alert. No obvious cranial nerve deficits.  Motor 

grossly within normal limits. Normal speech.


PSYCHIATRIC: Appropriate mood and affect; insight and judgment normal.





Data


Data


Last Documented VS





Vital Signs








  Date Time  Temp Pulse Resp B/P (MAP) Pulse Ox O2 Delivery O2 Flow Rate FiO2


 


17 23:26        


 


17 22:22   16  99 Room Air  


 


17 19:08 98.3 83      








Orders





 Orders


Complete Blood Count With Diff (17 21:55)


Comprehensive Metabolic Panel (17 21:55)


Lipase (17 21:55)


Prothrombin Time / Inr (Pt) (17 21:55)


Act Partial Throm Time (Ptt) (17 21:55)


Urinalysis - C+S If Indicated (17 21:55)


Iv Access Insert/Monitor (17 21:55)


Ecg Monitoring (17 21:55)


Oximetry (17 21:55)


Sodium Chloride 0.9% Flush (Ns Flush) (17 22:00)


Ed Urine Pregnancytest Poc (17 21:55)


Us Pelvis (Ques Preg/Ectopic) (17 )


Beta Hcg (Quant/Titer) (17 21:55)


Urine Culture (17 22:10)


Acetamin-Hydrocod 325-5 Mg (Norco  5-325 (17 22:45)


Cephalexin (Keflex) (17 23:00)


Lidocaine 1% Inj (50 Ml) (Xylocaine 1% I (17 23:30)


Ed Discharge Order (17 23:32)





Labs





Laboratory Tests








Test


  17


22:10


 


White Blood Count 8.6 TH/MM3 


 


Red Blood Count 4.70 MIL/MM3 


 


Hemoglobin 11.3 GM/DL 


 


Hematocrit 34.3 % 


 


Mean Corpuscular Volume 73.0 FL 


 


Mean Corpuscular Hemoglobin 24.0 PG 


 


Mean Corpuscular Hemoglobin


Concent 32.9 % 


 


 


Red Cell Distribution Width 14.0 % 


 


Platelet Count 282 TH/MM3 


 


Mean Platelet Volume 8.0 FL 


 


Neutrophils (%) (Auto) 46.0 % 


 


Lymphocytes (%) (Auto) 40.2 % 


 


Monocytes (%) (Auto) 10.8 % 


 


Eosinophils (%) (Auto) 2.4 % 


 


Basophils (%) (Auto) 0.6 % 


 


Neutrophils # (Auto) 4.0 TH/MM3 


 


Lymphocytes # (Auto) 3.5 TH/MM3 


 


Monocytes # (Auto) 0.9 TH/MM3 


 


Eosinophils # (Auto) 0.2 TH/MM3 


 


Basophils # (Auto) 0.1 TH/MM3 


 


CBC Comment DIFF FINAL 


 


Differential Comment  


 


Prothrombin Time 10.6 SEC 


 


Prothromb Time International


Ratio 1.0 RATIO 


 


 


Activated Partial


Thromboplast Time 29.7 SEC 


 


 


Urine Color YELLOW 


 


Urine Turbidity HAZY 


 


Urine pH 6.5 


 


Urine Specific Gravity 1.018 


 


Urine Protein NEG mg/dL 


 


Urine Glucose (UA) NEG mg/dL 


 


Urine Ketones NEG mg/dL 


 


Urine Occult Blood NEG 


 


Urine Nitrite NEG 


 


Urine Bilirubin NEG 


 


Urine Urobilinogen


  LESS THAN 2.0


MG/DL


 


Urine Leukocyte Esterase MOD 


 


Urine RBC 6 /hpf 


 


Urine WBC 12 /hpf 


 


Urine Squamous Epithelial


Cells 3 /hpf 


 


 


Urine Calcium Oxalate Crystals MANY /hpf 


 


Urine Amorphous Sediment RARE 


 


Urine Bacteria RARE /hpf 


 


Urine Mucus FEW /lpf 


 


Microscopic Urinalysis Comment


  CULTURE


INDICATED


 


Blood Urea Nitrogen 7 MG/DL 


 


Creatinine 0.75 MG/DL 


 


Random Glucose 93 MG/DL 


 


Total Protein 7.3 GM/DL 


 


Albumin 3.3 GM/DL 


 


Calcium Level 8.4 MG/DL 


 


Alkaline Phosphatase 86 U/L 


 


Aspartate Amino Transf


(AST/SGOT) 11 U/L 


 


 


Alanine Aminotransferase


(ALT/SGPT) 18 U/L 


 


 


Total Bilirubin 0.1 MG/DL 


 


Sodium Level 136 MEQ/L 


 


Potassium Level 3.7 MEQ/L 


 


Chloride Level 104 MEQ/L 


 


Carbon Dioxide Level 25.6 MEQ/L 


 


Anion Gap 6 MEQ/L 


 


Estimat Glomerular Filtration


Rate 117 ML/MIN 


 


 


Lipase 81 U/L 


 


Human Chorionic Gonadotropin,


Quant [id number] MIU/ML 


 











[facility]


Medical Decision Making


Medical Screen Exam Complete:  Yes


Emergency Medical Condition:  Yes


Differential Diagnosis


Dental infection, dental caries, gastritis, peptic ulcer disease, pancreatitis, 

hepatobiliary disease, appendicitis, pregnancy, ectopic pregnancy, PID, TOA, 

ovarian cyst, UTI, cystitis


Narrative Course


Vital signs show heart rate 83, blood pressure 145/66, pulse ox 100% on room air

, oral temp of 98.3F.





CBC: WBC 8.6, hemoglobin 11.3, hematocrit 34.3, platelets 282.


CMP is unremarkable.





UA:


Hazy urine, moderate leukocyte esterase, 6 rbc's, 12 wbc's, many oxalate 

crystals, rare bacteria, culture indicated.





Pelvic ultrasound:


CONCLUSION:     


Positive intrauterine pregnancy with positive heart tones.





Patient is resting comfortably.  She was made aware of all findings.  There are 

no peritoneal signs on her abdominal exam and I do not believe that there is an 

acute intra-abdominal/surgical process to warrant further imaging at this time.

  Right inferior alveolar nerve block will be performed, and the patient will 

be discharged home with a prescription for Keflex which will help both her 

dental pain/possible infection as well as her bacteriuria in pregnancy.  She 

was advised to follow-up with her OB/GYN physician this week.  She was informed 

on when to return to the emergency department.  She verbalizes understanding 

and agreement with plan.





Procedures


**Procedure Narrative**


Right inferior alveolar nerve block:


1 cc of 1% lidocaine was injected in the area of the right inferior alveolar 

nerve.  The patient experienced immediate relief of dental pain.  Tolerated 

well.  No complications.





Diagnosis





 Primary Impression:  


 Pregnancy


 Qualified Codes:  Z3A.09 - 9 weeks gestation of pregnancy


 Additional Impressions:  


 Pain, dental


 Bacteriuria during pregnancy


Referrals:  


Dentist


3 days





Obstetrician


3 days





***Additional Instructions:  


Follow-up with an OB/GYN physician this week.


Follow-up with a dentist this week.


Take antibiotics as prescribed.


Return to the emergency department for worsening symptoms or any other concerns.


Scripts


Cephalexin (Keflex) 500 Mg Cap


500 MG PO Q12H for Infection for 10 Days, #20 CAP 0 Refills


   Prov: Kodak Freeman MD         17


Disposition:  01 DISCHARGE HOME


Condition:  Stable











Kodak Freeman MD 2017 22:19

## 2017-11-12 NOTE — RADRPT
EXAM DATE/TIME:  2017 22:26 

 

[facility] COMPARISON:     

No previous studies available for comparison.

        

 

 

INDICATIONS :     

Pelvic pain. 

                     

 

LAB(S):     

 

Beta-hC

                     

 

MEDICAL HISTORY :     

Pregnancy.     

 

SURGICAL HISTORY :     

None.     

 

ENCOUNTER:     

Initial

 

ACUITY:     

1 week

 

PAIN SCORE:     

3/10

 

LOCATION:     

Bilateral pelvis 

                     

MEASUREMENTS:     

 

UTERUS:                                  

10.3 x 7.5 x 6.2 cm 

 

ENDOMETRIAL STRIPE:      

>20 mm

 

RIGHT OVARY:                      

3.2 x 2.4 x 2.3 cm 

 

FREE FLUID:                              

none

 

CROWN RUMP LENGTH:     

2.4 = 9 WKS 1 DAYS

 

FHR:                                         

163 BPM

 

FINDINGS:     

 

UTERUS:     

The myometrium has homogeneous echotexture without mass. 5.2 x 2.5 x 4.2 cm gestational sac with a po
sitive intrauterine pregnancy. Crown-rump length of 2.4 cm corresponds with a 9 week one day gestatio
n. There are positive heart tones present. 

 

RIGHT OVARY:     

Ovary contains no mass or significant cystic lesion other than a solitary functional cyst could be th
e corpus luteal cyst.  

 

LEFT OVARY:     

Ovary contains no mass or significant cystic lesion.  

 

MISCELLANEOUS:     

No free fluid.  

 

CONCLUSION:     

Positive intrauterine pregnancy with positive heart tones.

 

 

 

 Aaron Mcelroy MD on 2017 at 23:11           

Board Certified Radiologist.

 This report was verified electronically.

## 2018-01-03 ENCOUNTER — HOSPITAL ENCOUNTER (EMERGENCY)
Dept: HOSPITAL 17 - NEPE | Age: 23
Discharge: HOME | End: 2018-01-03
Payer: SELF-PAY

## 2018-01-03 VITALS
HEART RATE: 95 BPM | SYSTOLIC BLOOD PRESSURE: 128 MMHG | DIASTOLIC BLOOD PRESSURE: 72 MMHG | RESPIRATION RATE: 16 BRPM | TEMPERATURE: 98.9 F | OXYGEN SATURATION: 100 %

## 2018-01-03 VITALS — HEIGHT: 61 IN | BODY MASS INDEX: 35.38 KG/M2 | WEIGHT: 187.39 LBS

## 2018-01-03 DIAGNOSIS — O99.331: ICD-10-CM

## 2018-01-03 DIAGNOSIS — O99.311: ICD-10-CM

## 2018-01-03 DIAGNOSIS — O23.11: Primary | ICD-10-CM

## 2018-01-03 DIAGNOSIS — Z3A.10: ICD-10-CM

## 2018-01-03 LAB
AMORPHOUS SEDIMENT, URINE: (no result)
BACTERIA #/AREA URNS HPF: (no result) /HPF
BASOPHILS # BLD AUTO: 0 TH/MM3 (ref 0–0.2)
BASOPHILS NFR BLD: 0.4 % (ref 0–2)
COLOR UR: YELLOW
EOSINOPHIL # BLD: 0.6 TH/MM3 (ref 0–0.4)
EOSINOPHIL NFR BLD: 4.5 % (ref 0–4)
ERYTHROCYTE [DISTWIDTH] IN BLOOD BY AUTOMATED COUNT: 14.2 % (ref 11.6–17.2)
GLUCOSE UR STRIP-MCNC: (no result) MG/DL
HCT VFR BLD CALC: 32.4 % (ref 35–46)
HGB BLD-MCNC: 10.7 GM/DL (ref 11.6–15.3)
HGB UR QL STRIP: (no result)
KETONES UR STRIP-MCNC: (no result) MG/DL
LYMPHOCYTES # BLD AUTO: 3.5 TH/MM3 (ref 1–4.8)
LYMPHOCYTES NFR BLD AUTO: 27.6 % (ref 9–44)
MCH RBC QN AUTO: 23.9 PG (ref 27–34)
MCHC RBC AUTO-ENTMCNC: 33 % (ref 32–36)
MCV RBC AUTO: 72.3 FL (ref 80–100)
MONOCYTE #: 1.1 TH/MM3 (ref 0–0.9)
MONOCYTES NFR BLD: 8.5 % (ref 0–8)
MUCOUS THREADS #/AREA URNS LPF: (no result) /LPF
NEUTROPHILS # BLD AUTO: 7.4 TH/MM3 (ref 1.8–7.7)
NEUTROPHILS NFR BLD AUTO: 59 % (ref 16–70)
NITRITE UR QL STRIP: (no result)
PLATELET # BLD: 259 TH/MM3 (ref 150–450)
PMV BLD AUTO: 8.3 FL (ref 7–11)
RBC # BLD AUTO: 4.48 MIL/MM3 (ref 4–5.3)
SP GR UR STRIP: 1.02 (ref 1–1.03)
SQUAMOUS #/AREA URNS HPF: 6 /HPF (ref 0–5)
URINE LEUKOCYTE ESTERASE: (no result)
WBC # BLD AUTO: 12.5 TH/MM3 (ref 4–11)

## 2018-01-03 PROCEDURE — 84702 CHORIONIC GONADOTROPIN TEST: CPT

## 2018-01-03 PROCEDURE — 85025 COMPLETE CBC W/AUTO DIFF WBC: CPT

## 2018-01-03 PROCEDURE — 99284 EMERGENCY DEPT VISIT MOD MDM: CPT

## 2018-01-03 PROCEDURE — 84703 CHORIONIC GONADOTROPIN ASSAY: CPT

## 2018-01-03 PROCEDURE — 87086 URINE CULTURE/COLONY COUNT: CPT

## 2018-01-03 PROCEDURE — 81001 URINALYSIS AUTO W/SCOPE: CPT

## 2018-01-03 NOTE — PD
HPI


Chief Complaint:  Abdominal Pain


Time Seen by Provider:  01:42


Travel History


International Travel<30 days:  No


Contact w/Intl Traveler<30days:  No


Traveled to known affect area:  No





History of Present Illness


HPI


This is a 22-year-old  Ab1 who states she had a positive pregnancy test a 

couple weeks ago, presents today with points of tightness and fullness in her 

lower abdomen.  She reports it as a achy like pain.  She denies any vaginal 

bleeding or discharge.  She denies any urinary symptoms other than more 

frequent urination.  She states that she had a positive pregnancy test a few 

weeks ago.  She states that she was not sure how far along she was at that 

time.  She cannot tell me when her last menstrual cycle was.  She does state 

that she's been "partying".  When asked what that meant, she states that she's 

been drinking a lot.  When asked why she is drinking a lot, she reports it 

stems from her brother being killed and she is using alcohol to help deal with 

the pain.  She denies any other drugs of abuse.  There are no other complaints 

time my examination.





PFSH


Past Medical History


Developmental Delay:  No


Diminished Hearing:  No


Immunizations Current:  Yes


Pregnant?:  Pregnant


:  3


Para:  1


Miscarriage:  1





Past Surgical History


Surgical History:  No Previous Surgery





Social History


Alcohol Use:  Yes (DAILY- 1 DRINK/DAY)


Tobacco Use:  Yes (black n milds- 2/DAY)


Substance Use:  No





Allergies-Medications


(Allergen,Severity, Reaction):  


Coded Allergies:  


     *MDRO Multi-Drug Resistant Organism (Verified  Allergy, Unknown, 1/3/18)


 MRSA 


Reported Meds & Prescriptions





Reported Meds & Active Scripts


Active


Prenatal Plus Iron 29-1 mg (Prenatal Vit-Iron Carbonyl) 29 Mg Iron-1 Mg Tab 1 

Tab PO DAILY


Macrobid (Nitrofurantoin Monoh/Nitrofur Macro) 100 Mg Cap 100 Mg PO BID 7 Days


Keflex (Cephalexin) 500 Mg Cap 500 Mg PO Q12H 10 Days








Review of Systems


Except as stated in HPI:  all other systems reviewed are Neg


HENT:  No: Headaches, Neck Pain


Cardiovascular:  No: Chest Pain or Discomfort, Palpitations


Respiratory:  No: Cough, Shortness of Breath


Gastrointestinal:  Positive: Abdominal Pain, No: Nausea, Vomiting


Genitourinary:  Positive: Frequency, No: Dysuria, Flank Pain, Discharge, 

Vaginal Bleeding


Musculoskeletal:  No: Weakness, Pain


Neurologic:  No: Weakness, Dizziness





Physical Exam


Narrative


GENERAL: Well-nourished, well-developed patient.


SKIN: Focused skin assessment warm/dry.


HEAD: Normocephalic/atraumatic.


EYES: No scleral icterus. No injection or drainage. 


NECK: Supple, trachea midline. No JVD or lymphadenopathy.


CARDIOVASCULAR: Regular rate and rhythm without murmurs, gallops, or rubs. 


RESPIRATORY: Breath sounds equal bilaterally. No accessory muscle use.


GASTROINTESTINAL: Abdomen soft, nondistended.  She has subjective suprapubic 

discomfort to deep palpation.  There is no rebound or guarding.


MUSCULOSKELETAL: No cyanosis, or edema. 


NEUROLOGICAL: Awake and alert. Cranial nerves II through XII intact.  Motor 

grossly within normal limits. Five out of 5 muscle strength in all muscle 

groups.  Normal speech.





Data


Data


Last Documented VS





Vital Signs








  Date Time  Temp Pulse Resp B/P (MAP) Pulse Ox O2 Delivery O2 Flow Rate FiO2


 


1/3/18 01:13 98.9 95 16 128/72 (90) 100   








Orders





 Orders


Urinalysis - C+S If Indicated (1/3/18 01:41)


Ed Urine Pregnancytest Poc (1/3/18 01:41)


Urine Culture (1/3/18 01:50)


Complete Blood Count With Diff (1/3/18 02:23)


Beta Hcg (Quant/Titer) (1/3/18 02:23)


Nitrofurantoin Monohyd Macrocr (Macrobid (1/3/18 04:30)





Labs





Laboratory Tests








Test


  1/3/18


01:50 1/3/18


03:14


 


Urine Color YELLOW  


 


Urine Turbidity HAZY  


 


Urine pH 6.0  


 


Urine Specific Gravity 1.025  


 


Urine Protein TRACE mg/dL  


 


Urine Glucose (UA) NEG mg/dL  


 


Urine Ketones NEG mg/dL  


 


Urine Occult Blood NEG  


 


Urine Nitrite NEG  


 


Urine Bilirubin NEG  


 


Urine Urobilinogen 2.0 MG/DL  


 


Urine Leukocyte Esterase LARGE  


 


Urine RBC 15 /hpf  


 


Urine WBC 18 /hpf  


 


Urine Squamous Epithelial


Cells 6 /hpf 


  


 


 


Urine Amorphous Sediment RARE  


 


Urine Bacteria RARE /hpf  


 


Urine Mucus FEW /lpf  


 


Microscopic Urinalysis Comment


  CULTURE


INDICATED 


 


 


White Blood Count  12.5 TH/MM3 


 


Red Blood Count  4.48 MIL/MM3 


 


Hemoglobin  10.7 GM/DL 


 


Hematocrit  32.4 % 


 


Mean Corpuscular Volume  72.3 FL 


 


Mean Corpuscular Hemoglobin  23.9 PG 


 


Mean Corpuscular Hemoglobin


Concent 


  33.0 % 


 


 


Red Cell Distribution Width  14.2 % 


 


Platelet Count  259 TH/MM3 


 


Mean Platelet Volume  8.3 FL 


 


Neutrophils (%) (Auto)  59.0 % 


 


Lymphocytes (%) (Auto)  27.6 % 


 


Monocytes (%) (Auto)  8.5 % 


 


Eosinophils (%) (Auto)  4.5 % 


 


Basophils (%) (Auto)  0.4 % 


 


Neutrophils # (Auto)  7.4 TH/MM3 


 


Lymphocytes # (Auto)  3.5 TH/MM3 


 


Monocytes # (Auto)  1.1 TH/MM3 


 


Eosinophils # (Auto)  0.6 TH/MM3 


 


Basophils # (Auto)  0.0 TH/MM3 


 


CBC Comment  DIFF FINAL 


 


Differential Comment   


 


Human Chorionic Gonadotropin,


Quant 


  [id number] MIU/ML 


 











MDM


Medical Decision Making


Medical Screen Exam Complete:  Yes


Emergency Medical Condition:  Yes


Differential Diagnosis


Intrauterine pregnancy versus ectopic pregnancy versus pelvic infection


Narrative Course


Year-old female who presents with abdominal discomfort.  The patient denies any 

urinary symptoms.  Patient's urinalysis shows evidence of infection.  Cultures 

have been ordered on it.  The patient be given a prescription for Macrobid 7 

days per she's been given her first dose of Macrobid here.  Bedside ultrasound 

by this physician showed an intrauterine pregnancy that appears greater than 10 

weeks.  There is good fetal heart tones.  Heart tones were in the 150s 160s.  

Good fetal movement.  He also be given a prescription for prenatal vitamins.  

She is encouraged to follow up with an OB/GYN of her choice.  She is instructed 

return she'll say worsening discomfort, vaginal pain, or any other reason that 

concerned her.





Diagnosis





 Primary Impression:  


 cystitis


 Additional Impression:  


 Intrauterine pregnancy





***Additional Instructions:  


Return if vaginal bleeding, fevers chills, worsening abdominal pain, or any 

other reason the concerns you.


***Med/Other Pt SpecificInfo:  Prescription(s) given


Scripts


Prenatal Vit-Iron Carbonyl (Prenatal Plus Iron 29-1 mg) 29 Mg Iron-1 Mg Tab


1 TAB PO DAILY for Nutritional Supplement, #30 TAB 0 Refills


   Prov: Don Suresh MD         1/3/18 


Nitrofurantoin Monohydrate Macrocrystals (Macrobid) 100 Mg Cap


100 MG PO BID for Infection for 7 Days, #14 CAP 0 Refills


   Prov: Don Suresh MD         1/3/18


Disposition:   DISCHARGE HOME


Condition:  Stable











Don Suresh MD Harsh 3, 2018 01:44

## 2018-01-18 ENCOUNTER — HOSPITAL ENCOUNTER (EMERGENCY)
Dept: HOSPITAL 17 - NEPC | Age: 23
LOS: 1 days | Discharge: HOME | End: 2018-01-19
Payer: SELF-PAY

## 2018-01-18 VITALS — HEIGHT: 61 IN | WEIGHT: 189.6 LBS | BODY MASS INDEX: 35.8 KG/M2

## 2018-01-18 VITALS
TEMPERATURE: 97.9 F | DIASTOLIC BLOOD PRESSURE: 67 MMHG | RESPIRATION RATE: 20 BRPM | HEART RATE: 93 BPM | OXYGEN SATURATION: 100 % | SYSTOLIC BLOOD PRESSURE: 150 MMHG

## 2018-01-18 DIAGNOSIS — A74.9: ICD-10-CM

## 2018-01-18 DIAGNOSIS — O23.10: Primary | ICD-10-CM

## 2018-01-18 DIAGNOSIS — O98.819: ICD-10-CM

## 2018-01-18 DIAGNOSIS — Z3A.00: ICD-10-CM

## 2018-01-18 PROCEDURE — 87086 URINE CULTURE/COLONY COUNT: CPT

## 2018-01-18 PROCEDURE — 87491 CHLMYD TRACH DNA AMP PROBE: CPT

## 2018-01-18 PROCEDURE — 87210 SMEAR WET MOUNT SALINE/INK: CPT

## 2018-01-18 PROCEDURE — 81001 URINALYSIS AUTO W/SCOPE: CPT

## 2018-01-18 PROCEDURE — 80053 COMPREHEN METABOLIC PANEL: CPT

## 2018-01-18 PROCEDURE — 96372 THER/PROPH/DIAG INJ SC/IM: CPT

## 2018-01-18 PROCEDURE — 87591 N.GONORRHOEAE DNA AMP PROB: CPT

## 2018-01-18 PROCEDURE — 99284 EMERGENCY DEPT VISIT MOD MDM: CPT

## 2018-01-18 PROCEDURE — 85025 COMPLETE CBC W/AUTO DIFF WBC: CPT

## 2018-01-19 LAB
ALBUMIN SERPL-MCNC: 3.1 GM/DL (ref 3.4–5)
ALP SERPL-CCNC: 86 U/L (ref 45–117)
ALT SERPL-CCNC: 14 U/L (ref 10–53)
AMORPHOUS SEDIMENT, URINE: (no result)
AST SERPL-CCNC: 8 U/L (ref 15–37)
BACTERIA #/AREA URNS HPF: (no result) /HPF
BASOPHILS # BLD AUTO: 0 TH/MM3 (ref 0–0.2)
BASOPHILS NFR BLD: 0.5 % (ref 0–2)
BILIRUB SERPL-MCNC: 0.1 MG/DL (ref 0.2–1)
BUN SERPL-MCNC: 7 MG/DL (ref 7–18)
CALCIUM SERPL-MCNC: 8.5 MG/DL (ref 8.5–10.1)
CHLORIDE SERPL-SCNC: 107 MEQ/L (ref 98–107)
COLOR UR: (no result)
CREAT SERPL-MCNC: 0.5 MG/DL (ref 0.5–1)
EOSINOPHIL # BLD: 0.3 TH/MM3 (ref 0–0.4)
EOSINOPHIL NFR BLD: 2.7 % (ref 0–4)
ERYTHROCYTE [DISTWIDTH] IN BLOOD BY AUTOMATED COUNT: 14 % (ref 11.6–17.2)
GFR SERPLBLD BASED ON 1.73 SQ M-ARVRAT: 187 ML/MIN (ref 89–?)
GLUCOSE SERPL-MCNC: 76 MG/DL (ref 74–106)
GLUCOSE UR STRIP-MCNC: (no result) MG/DL
HCO3 BLD-SCNC: 23.3 MEQ/L (ref 21–32)
HCT VFR BLD CALC: 32.1 % (ref 35–46)
HGB BLD-MCNC: 10.6 GM/DL (ref 11.6–15.3)
HGB UR QL STRIP: (no result)
KETONES UR STRIP-MCNC: 10 MG/DL
LYMPHOCYTES # BLD AUTO: 3.3 TH/MM3 (ref 1–4.8)
LYMPHOCYTES NFR BLD AUTO: 31.9 % (ref 9–44)
MCH RBC QN AUTO: 24.2 PG (ref 27–34)
MCHC RBC AUTO-ENTMCNC: 33.1 % (ref 32–36)
MCV RBC AUTO: 72.9 FL (ref 80–100)
MONOCYTE #: 0.8 TH/MM3 (ref 0–0.9)
MONOCYTES NFR BLD: 8 % (ref 0–8)
MUCOUS THREADS #/AREA URNS LPF: (no result) /LPF
NEUTROPHILS # BLD AUTO: 5.9 TH/MM3 (ref 1.8–7.7)
NEUTROPHILS NFR BLD AUTO: 56.9 % (ref 16–70)
NITRITE UR QL STRIP: (no result)
PLATELET # BLD: 245 TH/MM3 (ref 150–450)
PMV BLD AUTO: 8.3 FL (ref 7–11)
PROT SERPL-MCNC: 7.4 GM/DL (ref 6.4–8.2)
RBC # BLD AUTO: 4.4 MIL/MM3 (ref 4–5.3)
SODIUM SERPL-SCNC: 139 MEQ/L (ref 136–145)
SP GR UR STRIP: 1.01 (ref 1–1.03)
SQUAMOUS #/AREA URNS HPF: 5 /HPF (ref 0–5)
URINE LEUKOCYTE ESTERASE: (no result)
WBC # BLD AUTO: 10.4 TH/MM3 (ref 4–11)

## 2018-04-17 ENCOUNTER — HOSPITAL ENCOUNTER (EMERGENCY)
Dept: HOSPITAL 17 - HOBED | Age: 23
Discharge: HOME | End: 2018-04-17
Payer: COMMERCIAL

## 2018-04-17 DIAGNOSIS — B37.3: ICD-10-CM

## 2018-04-17 DIAGNOSIS — O98.813: ICD-10-CM

## 2018-04-17 DIAGNOSIS — Z3A.31: ICD-10-CM

## 2018-04-17 DIAGNOSIS — O09.33: Primary | ICD-10-CM

## 2018-04-17 DIAGNOSIS — R82.71: ICD-10-CM

## 2018-04-17 LAB
BACTERIA #/AREA URNS HPF: (no result) /HPF
BASOPHILS # BLD AUTO: 0 TH/MM3 (ref 0–0.2)
BASOPHILS NFR BLD: 0.4 % (ref 0–2)
COLOR UR: YELLOW
EOSINOPHIL # BLD: 0.3 TH/MM3 (ref 0–0.4)
EOSINOPHIL NFR BLD: 2.1 % (ref 0–4)
ERYTHROCYTE [DISTWIDTH] IN BLOOD BY AUTOMATED COUNT: 12.9 % (ref 11.6–17.2)
GLUCOSE UR STRIP-MCNC: (no result) MG/DL
HCT VFR BLD CALC: 34.4 % (ref 35–46)
HGB BLD-MCNC: 11.2 GM/DL (ref 11.6–15.3)
HGB UR QL STRIP: (no result)
KETONES UR STRIP-MCNC: 10 MG/DL
LYMPHOCYTES # BLD AUTO: 2.9 TH/MM3 (ref 1–4.8)
LYMPHOCYTES NFR BLD AUTO: 24.5 % (ref 9–44)
MCH RBC QN AUTO: 23.5 PG (ref 27–34)
MCHC RBC AUTO-ENTMCNC: 32.6 % (ref 32–36)
MCV RBC AUTO: 72 FL (ref 80–100)
MONOCYTE #: 1 TH/MM3 (ref 0–0.9)
MONOCYTES NFR BLD: 8 % (ref 0–8)
MUCOUS THREADS #/AREA URNS LPF: (no result) /LPF
NEUTROPHILS # BLD AUTO: 7.7 TH/MM3 (ref 1.8–7.7)
NEUTROPHILS NFR BLD AUTO: 65 % (ref 16–70)
NITRITE UR QL STRIP: (no result)
PLATELET # BLD: 284 TH/MM3 (ref 150–450)
PMV BLD AUTO: 8.3 FL (ref 7–11)
RBC # BLD AUTO: 4.78 MIL/MM3 (ref 4–5.3)
SP GR UR STRIP: 1.02 (ref 1–1.03)
SQUAMOUS #/AREA URNS HPF: 2 /HPF (ref 0–5)
URINE LEUKOCYTE ESTERASE: (no result)
WBC # BLD AUTO: 11.9 TH/MM3 (ref 4–11)

## 2018-04-17 PROCEDURE — 87210 SMEAR WET MOUNT SALINE/INK: CPT

## 2018-04-17 PROCEDURE — 80307 DRUG TEST PRSMV CHEM ANLYZR: CPT

## 2018-04-17 PROCEDURE — 76805 OB US >/= 14 WKS SNGL FETUS: CPT

## 2018-04-17 PROCEDURE — 99284 EMERGENCY DEPT VISIT MOD MDM: CPT

## 2018-04-17 PROCEDURE — 80074 ACUTE HEPATITIS PANEL: CPT

## 2018-04-17 PROCEDURE — 86900 BLOOD TYPING SEROLOGIC ABO: CPT

## 2018-04-17 PROCEDURE — 36415 COLL VENOUS BLD VENIPUNCTURE: CPT

## 2018-04-17 PROCEDURE — G0481 DRUG TEST DEF 8-14 CLASSES: HCPCS

## 2018-04-17 PROCEDURE — 86901 BLOOD TYPING SEROLOGIC RH(D): CPT

## 2018-04-17 PROCEDURE — 87491 CHLMYD TRACH DNA AMP PROBE: CPT

## 2018-04-17 PROCEDURE — 86592 SYPHILIS TEST NON-TREP QUAL: CPT

## 2018-04-17 PROCEDURE — 81001 URINALYSIS AUTO W/SCOPE: CPT

## 2018-04-17 PROCEDURE — 86850 RBC ANTIBODY SCREEN: CPT

## 2018-04-17 PROCEDURE — 86703 HIV-1/HIV-2 1 RESULT ANTBDY: CPT

## 2018-04-17 PROCEDURE — 85025 COMPLETE CBC W/AUTO DIFF WBC: CPT

## 2018-04-17 PROCEDURE — 87591 N.GONORRHOEAE DNA AMP PROB: CPT

## 2018-04-17 PROCEDURE — 86762 RUBELLA ANTIBODY: CPT

## 2018-04-17 NOTE — PD
HPI


Chief Complaint


decreased fetal movement, vaginal discharge


Date Seen:  2018


Time Seen:  14:00


Travel History


International Travel<30 Days:  No


Contact w/Intl Traveler<30Days:  No


Known Affected Area:  No





History of Present Illness


HPI


Ms Jasmine is a 22YO  reporting to be at 5.5 months with no PNC who p/w 

report of decreased fetal movement and vaginal discharge. Pt reports her 1st 

pregnancy ended in fetal demise at approx 25 weeks and pt had leakage of fluid 

that she did not know had caused the demise and is worried that she has some 

whitish vaginal discharge and is not sure if she is leaking fluid. Pt states 

her fetus has been butterfly kicking vigorously up until 2 days ago and is 

still kicking but not at baseline. Pt reports no overt LOF or VB, but has had 

some whitish vaginal discharge and is concerned about the possibility of occult 

LOF. Additionally, pt complains of occasional stabbing pain in left flank. Pt 

plans to establish PNC with [facility] but complains the soonest she can get an appt 

is May 15th. Pt has had 3 HAs this week, occasionally dizzy, states she has 

been hydrating, and takes no medications (including no PNV). States she is not 

nauseous but vomited once during this pregnancy about a week ago. States she 

had stabbing CP last night that resolved. Denies SOB, current N/V/D, abdominal 

or DVT leg pain.


Para:  2


:  3





History


Past Medical History


Medical History:  Denies Significant Hx





Obstetric History


Obstetric History








1st pregnancy ended in fetal demise at 22-25 weeks


2nd pregnancy delivered at term via 





Family History


*** Narrative Family History


Sister has DM that began during her pregnancy when she was 18


Father - DM, liver problems





Allergies-Medications


(Allergen,Severity, Reaction):  


Coded Allergies:  


     *MDRO Multi-Drug Resistant Organism (Verified  Allergy, Unknown, 18)


 MRSA 


Home Meds


Active Scripts


Acetaminophen (Tylenol) 325 Mg Tab, 650 MG PO Q6H Y for PAIN SCALE 1 TO 4, #20 

TAB 0 Refills


   Prov:Shital Tee DO         18


Nitrofurantoin Monohydrate Macrocrystals (Macrobid) 100 Mg Cap, 100 MG PO BID 

for Infection for 7 Days, #14 CAP 0 Refills


   Prov:Shital Tee DO         18





Review of Systems


General / Constitutional:  No: Fever, Chills


Eyes:  No: Blurred Vision, Visual changes


HENT:  No: Headaches


Cardiovascular:  Chest Pain or Discomfort (last night had stabbing CP), No: 

Palpitations


Respiratory:  No: Cough, Short of Breath


Gastrointestinal:  Vomiting (1x last week), No: Nausea, Diarrhea, Abdominal Pain

, Constipation


Genitourinary:  Discharge, No: Dysuria, Vaginal Bleeding


Musculoskeletal:  No: Edema


Skin:  No Rash


Neurologic:  Dizziness, Headache, No: Weakness





Physical Exam


T - 98.0 / HR 91 / /65 / RR 16 /


Narrative


GENERAL: Well-nourished, well-developed patient in NAD.


SKIN: Warm and dry. No rash or lesions.


HEAD: Normocephalic and atraumatic.


EYES: No scleral icterus. No injection or drainage. EOMI.


ENT: No nasal drainage noted. Mucous membranes pink. Airway patent.


NECK: Supple, trachea midline. No JVD.


CARDIOVASCULAR: Regular rate and rhythm without murmurs, gallops, or rubs. 


RESPIRATORY: Breath sounds equal bilaterally. No accessory muscle use. NO 

increased WOB.


ABDOMEN/GI: Abdomen soft, non-tender, bowel sounds present, no rebound, no 

guarding 


   Gravid to 30 weeks size


GENITOURINARY: external genitalia normal in appearance; copious white discharge 

in the vagina.


   Cervix: closed


   Dilatation: 0          


   Effacement: -          


   Station: -  


   Presentation: -        


   Membranes: intact


   Uterine Contractions: absent


FHT's: 


   Category: 1   


   Baseline: 145   


   Reactive: yes   


   Variability: moderate  


   Decels: absent  


EXTREMITIES: No cyanosis or edema.


BACK: Nontender without obvious deformity. No CVA tenderness.


NEUROLOGICAL: Awake and alert. Motor and sensory grossly within normal limits. 

Five out of 5 muscle strength in all muscle groups. Normal speech.





Data


Data


Vital Signs Reviewed:  Yes





MDM


Medical Record Reviewed:  Yes


Narrative Course / MDM


22YO  at estimated 31/1 weeks based on OB US in 3rd trimester today () ([facility] 18) with no PNC p/w report of decreased fetal movement and 

whitish vaginal discharge x3 days. Fundal height measured at 30cm; FHT 

reassuring Cat 1, , reactive, moderate, no decels.





1. IUP 


-Monitor and toco


-UA


-Prenatal labs


-STI screen + wet prep


-OB US showing GA 31/1 weeks





Pt seen and dw Dr Santiago


Diagnosis


Diagnosis:  


 Primary Impression:  


 No prenatal care in current pregnancy


 Qualified Codes:  O09.33 - Supervision of pregnancy with insufficient 

 care, third trimester


 Additional Impression:  


 Yeast vaginitis


Disposition:  01 DISCHARGE HOME


Condition:  Stable


Scripts


Prenatal Vit-Iron Carbonyl (Prenatal Plus Iron 29-1 mg) 29 Mg Iron-1 Mg Tab


1 TAB PO DAILY for Nutritional Supplement, #30 TAB 0 Refills


   Prov: Dane Mi MD R1         18 


Miconazole 3 Vaginal Supp (Miconazole 3 Vaginal Supp) 200 Mg Supp


200 MG VAGINAL HS for Infection for 3 Days, #3 SUPP 0 Refills


   Prov: Dane iM MD R1         18











Dane Mi MD R1 2018 14:47

## 2018-04-22 ENCOUNTER — HOSPITAL ENCOUNTER (EMERGENCY)
Dept: HOSPITAL 17 - HOBED | Age: 23
Discharge: HOME | End: 2018-04-22
Payer: COMMERCIAL

## 2018-04-22 DIAGNOSIS — Z3A.31: ICD-10-CM

## 2018-04-22 DIAGNOSIS — O26.893: Primary | ICD-10-CM

## 2018-04-22 PROCEDURE — 99284 EMERGENCY DEPT VISIT MOD MDM: CPT

## 2018-04-22 NOTE — PD
HPI


Chief Complaint


cramping


Date Seen:  2018


Time Seen:  14:06


Travel History


International Travel<30 Days:  No


Contact w/Intl Traveler<30Days:  No


Known Affected Area:  No





History of Present Illness


HPI


pt. is a 25 y/o  @ 31 6/7 weeks present w/ c/o abdominal cramping.  pt. 

states truout the day has had increasing abdominal cramping in particular on 

her left side.  pt. states had small bm this am, voiding w/o diff and no blood 

noted in urine.  +FM, no lof/vb.  denies n/v


Weeks Gestation:  31


Para:  1


:  3





History


Past Medical History


Medical History:  Denies Significant Hx





Obstetric History


Obstetric History


,  x 2, 22 week fetal demise in 2016





Past Surgical History


Surgical History:  No Previous Surgery





Family History


Family History:  Negative





Social History


Alcohol Use:  No


Tobacco Use:  No


Substance Abuse:  No





Allergies-Medications


(Allergen,Severity, Reaction):  


Coded Allergies:  


     *MDRO Multi-Drug Resistant Organism (Verified  Allergy, Unknown, 18)


 MRSA 


Home Meds


Active Scripts


Prenatal Vit-Iron Carbonyl (Prenatal Plus Iron 29-1 mg) 29 Mg Iron-1 Mg Tab, 1 

TAB PO DAILY for Nutritional Supplement, #30 TAB 0 Refills


   Prov:Dane Mi MD R1         18


Miconazole 3 Vaginal Supp (Miconazole 3 Vaginal Supp) 200 Mg Supp, 200 MG 

VAGINAL HS for Infection for 3 Days, #3 SUPP 0 Refills


   Prov:Dane Mi MD R1         18


Acetaminophen (Tylenol) 325 Mg Tab, 650 MG PO Q6H Y for PAIN SCALE 1 TO 4, #20 

TAB 0 Refills


   Prov:Shital Tee DO         18


Nitrofurantoin Monohydrate Macrocrystals (Macrobid) 100 Mg Cap, 100 MG PO BID 

for Infection for 7 Days, #14 CAP 0 Refills


   Prov:Shital Tee DO         18





Review of Systems


Except as stated in HPI:  all other systems reviewed are Neg





Physical Exam


Narrative


GENERAL: Well-nourished, well-developed patient.


SKIN: Warm and dry.


HEAD: Normocephalic and atraumatic.


EYES: No scleral icterus. No injection or drainage. 


ENT: No nasal drainage noted. Mucous membranes pink. Airway patent.


NECK: Supple, trachea midline. No JVD.


CARDIOVASCULAR: Regular rate and rhythm without murmurs, gallops, or rubs. 


RESPIRATORY: Breath sounds equal bilaterally. No accessory muscle use.


BREASTS: Bilateral exam showed no masses , no retractions, no nipple discharge.


ABDOMEN/GI: Abdomen soft, non-tender, bowel sounds present, no rebound, no 

guarding 


   Gravid


GENITOURINARY: 


   External Genitalia: intact and normal in appearance


   Cervix: post


   Dilatation: 1        


   Effacement: 40   


   Station: high


   Uterine Contractions: irreg


FHT's: 


   Category: 1


   Reactive: +  


   Variability: mod 


EXTREMITIES: No cyanosis or edema.


BACK: Nontender without obvious deformity. No CVA tenderness.


NEUROLOGICAL: Awake and alert. Motor and sensory grossly within normal limits. 

Five out of 5 muscle strength in all muscle groups. Normal speech.





Data


Data


Vital Signs Reviewed:  Yes


Orders





 Orders


Acetaminophen (Tylenol) (18 14:15)





[facility]


Medical Record Reviewed:  Yes


Plan


pt. to be d/c to home.  pt. w/o pnc has appointment tomorrow for 1st prenatal 

visit.  condition d/w pt.  all ? answered.  pt. given precautions for return.


Diagnosis


Diagnosis:  


 Primary Impression:  


 Abdominal pain affecting pregnancy


 Additional Impression:  


 31 weeks gestation of pregnancy


Disposition:  01 DISCHARGE HOME











Delano Santiago Jr., MD 2018 14:12

## 2018-05-15 ENCOUNTER — HOSPITAL ENCOUNTER (OUTPATIENT)
Dept: HOSPITAL 17 - HPND | Age: 23
End: 2018-05-15
Attending: OBSTETRICS & GYNECOLOGY
Payer: COMMERCIAL

## 2018-05-15 DIAGNOSIS — O09.293: ICD-10-CM

## 2018-05-15 DIAGNOSIS — O26.843: ICD-10-CM

## 2018-05-15 DIAGNOSIS — O09.33: Primary | ICD-10-CM

## 2018-05-15 PROCEDURE — 76816 OB US FOLLOW-UP PER FETUS: CPT

## 2018-06-16 ENCOUNTER — HOSPITAL ENCOUNTER (INPATIENT)
Dept: HOSPITAL 17 - HOBED | Age: 23
LOS: 4 days | Discharge: HOME | End: 2018-06-20
Attending: OBSTETRICS & GYNECOLOGY | Admitting: OBSTETRICS & GYNECOLOGY
Payer: COMMERCIAL

## 2018-06-16 VITALS — BODY MASS INDEX: 38.71 KG/M2 | HEIGHT: 61 IN | WEIGHT: 205.03 LBS

## 2018-06-16 DIAGNOSIS — F12.90: ICD-10-CM

## 2018-06-16 DIAGNOSIS — Z86.14: ICD-10-CM

## 2018-06-16 DIAGNOSIS — E66.9: ICD-10-CM

## 2018-06-16 DIAGNOSIS — Z83.3: ICD-10-CM

## 2018-06-16 DIAGNOSIS — Z3A.39: ICD-10-CM

## 2018-06-16 DIAGNOSIS — R42: ICD-10-CM

## 2018-06-16 PROCEDURE — 85014 HEMATOCRIT: CPT

## 2018-06-16 PROCEDURE — 88307 TISSUE EXAM BY PATHOLOGIST: CPT

## 2018-06-16 PROCEDURE — G0481 DRUG TEST DEF 8-14 CLASSES: HCPCS

## 2018-06-16 PROCEDURE — 85018 HEMOGLOBIN: CPT

## 2018-06-16 PROCEDURE — 86900 BLOOD TYPING SEROLOGIC ABO: CPT

## 2018-06-16 PROCEDURE — 86920 COMPATIBILITY TEST SPIN: CPT

## 2018-06-16 PROCEDURE — 87086 URINE CULTURE/COLONY COUNT: CPT

## 2018-06-16 PROCEDURE — 59025 FETAL NON-STRESS TEST: CPT

## 2018-06-16 PROCEDURE — 85461 HEMOGLOBIN FETAL: CPT

## 2018-06-16 PROCEDURE — 82805 BLOOD GASES W/O2 SATURATION: CPT

## 2018-06-16 PROCEDURE — 87641 MR-STAPH DNA AMP PROBE: CPT

## 2018-06-16 PROCEDURE — 86850 RBC ANTIBODY SCREEN: CPT

## 2018-06-16 PROCEDURE — 90384 RH IG FULL-DOSE IM: CPT

## 2018-06-16 PROCEDURE — 86901 BLOOD TYPING SEROLOGIC RH(D): CPT

## 2018-06-16 PROCEDURE — 85025 COMPLETE CBC W/AUTO DIFF WBC: CPT

## 2018-06-16 PROCEDURE — 90715 TDAP VACCINE 7 YRS/> IM: CPT

## 2018-06-16 PROCEDURE — 36430 TRANSFUSION BLD/BLD COMPNT: CPT

## 2018-06-16 PROCEDURE — P9016 RBC LEUKOCYTES REDUCED: HCPCS

## 2018-06-16 PROCEDURE — 87070 CULTURE OTHR SPECIMN AEROBIC: CPT

## 2018-06-16 PROCEDURE — 85027 COMPLETE CBC AUTOMATED: CPT

## 2018-06-16 PROCEDURE — 81001 URINALYSIS AUTO W/SCOPE: CPT

## 2018-06-16 PROCEDURE — 80307 DRUG TEST PRSMV CHEM ANLYZR: CPT

## 2018-06-16 NOTE — PD
HPI


Chief Complaint


Contractions


Travel History


International Travel<30 Days:  No


Contact w/Intl Traveler<30Days:  No


Known Affected Area:  No





History of Present Illness


HPI


23-year-old , IUP at 39.2





Prenatal care complicated by obesity, history of PPROM with loss at 22-23 weeks

, history of SGA delivery, trichomonas, late prenatal care, ascus Pap, 

marijuana use





The patient presents complaining of the onset of painful contractions at about 

830 tonight.  She repeat reports they have increased in intensity and frequency 

since that time.  There are no aggravating or alleviating factors and no 

attempted treatments.  She reports that she is feeling extremely painful 

contractions every 10-15 minutes.  She reports good fetal movement.  She denies 

any leaking of fluid or vaginal bleeding.


Weeks Gestation:  39


Para:  1


:  3





History


Past Medical History


*** Narrative Medical


Obesity





Obstetric History


Obstetric History





 1 4 lbs. 15 oz. at 37 weeks


P PROM at 22-23 weeks with demise/stillborn





Past Surgical History


Surgical History:  No Previous Surgery





Family History


*** Narrative Family History


DM, liver disease





Social History


Alcohol Use:  No


Tobacco Use:  No ([place] 2 years ago)


Substance Abuse:  Yes (Lexi)





Allergies-Medications


(Allergen,Severity, Reaction):  


Coded Allergies:  


     *MDRO Multi-Drug Resistant Organism (Verified  Allergy, Unknown, 18)


 MRSA 


     No Known Allergies (Verified  Allergy, Unknown, 18)


Home Meds


Active Scripts


Prenatal Vit-Iron Carbonyl (Prenatal Plus Iron 29-1 mg) 29 Mg Iron-1 Mg Tab, 1 

TAB PO DAILY for Nutritional Supplement, #30 TAB 0 Refills


   Prov:Dane Mi MD R1         18


Reported Medications


Ferrous Sulfate (Ferrous Sulfate) 325 Mg (65 Mg Iron) Tablet, 325 MG PO BIDPC 

for Nutritional Supplement, #60 TAB 0 Refills


   18


Discontinued Scripts


Miconazole 3 Vaginal Supp (Miconazole 3 Vaginal Supp) 200 Mg Supp, 200 MG 

VAGINAL HS for Infection for 3 Days, #3 SUPP 0 Refills


   Prov:Dane Mi MD R1         18


Acetaminophen (Tylenol) 325 Mg Tab, 650 MG PO Q6H Y for PAIN SCALE 1 TO 4, #20 

TAB 0 Refills


   Prov:Shital Tee DO         18


Nitrofurantoin Monohydrate Macrocrystals (Macrobid) 100 Mg Cap, 100 MG PO BID 

for Infection for 7 Days, #14 CAP 0 Refills


   Prov:Shital Tee DO         18





Review of Systems


Except as stated in HPI:  all other systems reviewed are Neg





Physical Exam


Narrative


GENERAL: Well-nourished, well-developed patient.


SKIN: Warm and dry.


HEAD: Normocephalic and atraumatic.


EYES: No scleral icterus. No injection or drainage. 


ENT: No nasal drainage noted. Mucous membranes pink. Airway patent.


NECK: Supple, trachea midline. No JVD.


CARDIOVASCULAR: Regular rate and rhythm without murmurs, gallops, or rubs. 


RESPIRATORY: Breath sounds equal bilaterally. No accessory muscle use.


BREASTS: Bilateral exam showed no masses , no retractions, no nipple discharge.


ABDOMEN/GI: Abdomen soft, non-tender, bowel sounds present, no rebound, no 

guarding 


   Gravid 


GENITOURINARY: 


   External Genitalia: intact and normal in appearance.  Normal BUS.  No 

cervical or vaginal masses noted.  SVE 4-5/80/-2 with a stretchy cervix.  

Physiologic discharge, grossly normal rugate


FHT's: Fetal heart tones are in the


EXTREMITIES: No cyanosis or edema.


BACK: Nontender without obvious deformity. No CVA tenderness.


NEUROLOGICAL/musculoskeletal: Awake and alert. Motor and sensory grossly within 

normal limits.  Grossly normal muscle strength in all muscle groups. Normal 

speech.  Grossly normal range of motion, gait not assessed


Psychiatric: Grossly normal memory and affect





MDM


Plan


Assessment/plan:


1.  IUP at 39.2


2.  Active labor: We will admit the patient for labor at term.  Will manage 

expectantly at this time.  Discussed the risks of  including shoulder 

dystocia which is an obstetrical emergency and has potential risks of the 

 and mother as well as the risks and indications of  section.  

We discussed the risks that include pain, infection, bleeding, injury to other 

organs like bladder/bowel/nerves/vessels, injury to the baby, need for repeat 

operation, need for hysterectomy, need for blood transfusion, wound infection/

breakdown, and other possible complications.  We discussed that this baby is 

likely larger than her previous however per ultrasound report with a normal 

weight.  Ultrasound on 5/15/18 was estimated fetal weight 2407 g, 5 lbs. 4 oz.  

All the patient's questions were answered and we will proceed with admission.


3.  Obesity


4.  History of marijuana use: UDS tonight


5.  GBS negative


6.  history of PPROM with loss at 22-23 weeks


7.  history of SGA delivery, 


8.  trichomonas


9.  late prenatal care


10. ascus Pap


11. Rh negative











Madalyn Jasmine MD 2018 23:52

## 2018-06-17 VITALS — DIASTOLIC BLOOD PRESSURE: 55 MMHG | SYSTOLIC BLOOD PRESSURE: 106 MMHG | HEART RATE: 84 BPM

## 2018-06-17 VITALS
SYSTOLIC BLOOD PRESSURE: 124 MMHG | DIASTOLIC BLOOD PRESSURE: 62 MMHG | HEART RATE: 109 BPM | RESPIRATION RATE: 16 BRPM | TEMPERATURE: 99.3 F

## 2018-06-17 VITALS — HEART RATE: 114 BPM | SYSTOLIC BLOOD PRESSURE: 144 MMHG | DIASTOLIC BLOOD PRESSURE: 75 MMHG

## 2018-06-17 VITALS — HEART RATE: 96 BPM | RESPIRATION RATE: 18 BRPM

## 2018-06-17 VITALS — HEART RATE: 113 BPM

## 2018-06-17 VITALS — RESPIRATION RATE: 16 BRPM | HEART RATE: 88 BPM

## 2018-06-17 VITALS — HEART RATE: 87 BPM | OXYGEN SATURATION: 100 %

## 2018-06-17 VITALS — SYSTOLIC BLOOD PRESSURE: 137 MMHG | DIASTOLIC BLOOD PRESSURE: 93 MMHG | HEART RATE: 94 BPM

## 2018-06-17 VITALS — HEART RATE: 131 BPM | RESPIRATION RATE: 20 BRPM

## 2018-06-17 VITALS — DIASTOLIC BLOOD PRESSURE: 74 MMHG | HEART RATE: 117 BPM | SYSTOLIC BLOOD PRESSURE: 123 MMHG

## 2018-06-17 VITALS — DIASTOLIC BLOOD PRESSURE: 90 MMHG | SYSTOLIC BLOOD PRESSURE: 160 MMHG | HEART RATE: 118 BPM

## 2018-06-17 VITALS — HEART RATE: 89 BPM | OXYGEN SATURATION: 100 %

## 2018-06-17 VITALS — HEART RATE: 109 BPM | DIASTOLIC BLOOD PRESSURE: 49 MMHG | SYSTOLIC BLOOD PRESSURE: 121 MMHG

## 2018-06-17 VITALS — RESPIRATION RATE: 16 BRPM

## 2018-06-17 VITALS — DIASTOLIC BLOOD PRESSURE: 71 MMHG | SYSTOLIC BLOOD PRESSURE: 155 MMHG | HEART RATE: 131 BPM

## 2018-06-17 VITALS — RESPIRATION RATE: 18 BRPM | HEART RATE: 114 BPM

## 2018-06-17 VITALS — OXYGEN SATURATION: 100 % | HEART RATE: 132 BPM

## 2018-06-17 VITALS — SYSTOLIC BLOOD PRESSURE: 120 MMHG | DIASTOLIC BLOOD PRESSURE: 65 MMHG | HEART RATE: 99 BPM

## 2018-06-17 VITALS — HEART RATE: 125 BPM

## 2018-06-17 VITALS — SYSTOLIC BLOOD PRESSURE: 150 MMHG | HEART RATE: 104 BPM | DIASTOLIC BLOOD PRESSURE: 80 MMHG

## 2018-06-17 VITALS — SYSTOLIC BLOOD PRESSURE: 105 MMHG | HEART RATE: 103 BPM | DIASTOLIC BLOOD PRESSURE: 68 MMHG

## 2018-06-17 VITALS — HEART RATE: 90 BPM

## 2018-06-17 VITALS — HEART RATE: 98 BPM

## 2018-06-17 VITALS — DIASTOLIC BLOOD PRESSURE: 68 MMHG | SYSTOLIC BLOOD PRESSURE: 149 MMHG | HEART RATE: 114 BPM

## 2018-06-17 VITALS — HEART RATE: 93 BPM

## 2018-06-17 VITALS — SYSTOLIC BLOOD PRESSURE: 144 MMHG | DIASTOLIC BLOOD PRESSURE: 91 MMHG | HEART RATE: 133 BPM

## 2018-06-17 VITALS
DIASTOLIC BLOOD PRESSURE: 58 MMHG | RESPIRATION RATE: 18 BRPM | HEART RATE: 111 BPM | SYSTOLIC BLOOD PRESSURE: 114 MMHG | OXYGEN SATURATION: 100 %

## 2018-06-17 VITALS — HEART RATE: 79 BPM

## 2018-06-17 VITALS — HEART RATE: 83 BPM | OXYGEN SATURATION: 100 % | RESPIRATION RATE: 18 BRPM

## 2018-06-17 VITALS — HEART RATE: 108 BPM

## 2018-06-17 VITALS — RESPIRATION RATE: 18 BRPM | TEMPERATURE: 98.2 F | OXYGEN SATURATION: 100 % | HEART RATE: 99 BPM

## 2018-06-17 VITALS — HEART RATE: 129 BPM | SYSTOLIC BLOOD PRESSURE: 144 MMHG | DIASTOLIC BLOOD PRESSURE: 53 MMHG

## 2018-06-17 VITALS — HEART RATE: 92 BPM

## 2018-06-17 VITALS — HEART RATE: 130 BPM

## 2018-06-17 VITALS — HEART RATE: 112 BPM | DIASTOLIC BLOOD PRESSURE: 59 MMHG | SYSTOLIC BLOOD PRESSURE: 135 MMHG

## 2018-06-17 VITALS — SYSTOLIC BLOOD PRESSURE: 120 MMHG | DIASTOLIC BLOOD PRESSURE: 72 MMHG | HEART RATE: 106 BPM

## 2018-06-17 VITALS — SYSTOLIC BLOOD PRESSURE: 112 MMHG | DIASTOLIC BLOOD PRESSURE: 56 MMHG | HEART RATE: 76 BPM

## 2018-06-17 VITALS — RESPIRATION RATE: 16 BRPM | DIASTOLIC BLOOD PRESSURE: 66 MMHG | HEART RATE: 92 BPM | SYSTOLIC BLOOD PRESSURE: 122 MMHG

## 2018-06-17 VITALS — RESPIRATION RATE: 18 BRPM | HEART RATE: 121 BPM

## 2018-06-17 VITALS — HEART RATE: 88 BPM | OXYGEN SATURATION: 100 %

## 2018-06-17 VITALS — HEART RATE: 119 BPM

## 2018-06-17 VITALS — HEART RATE: 116 BPM | OXYGEN SATURATION: 100 %

## 2018-06-17 VITALS — HEART RATE: 86 BPM | SYSTOLIC BLOOD PRESSURE: 112 MMHG | DIASTOLIC BLOOD PRESSURE: 55 MMHG

## 2018-06-17 VITALS — HEART RATE: 123 BPM

## 2018-06-17 VITALS — DIASTOLIC BLOOD PRESSURE: 81 MMHG | SYSTOLIC BLOOD PRESSURE: 138 MMHG | HEART RATE: 119 BPM

## 2018-06-17 VITALS — DIASTOLIC BLOOD PRESSURE: 66 MMHG | HEART RATE: 99 BPM | SYSTOLIC BLOOD PRESSURE: 116 MMHG

## 2018-06-17 VITALS — DIASTOLIC BLOOD PRESSURE: 84 MMHG | HEART RATE: 112 BPM | SYSTOLIC BLOOD PRESSURE: 150 MMHG

## 2018-06-17 VITALS — RESPIRATION RATE: 18 BRPM | HEART RATE: 88 BPM | OXYGEN SATURATION: 100 %

## 2018-06-17 VITALS — HEART RATE: 88 BPM | SYSTOLIC BLOOD PRESSURE: 108 MMHG | DIASTOLIC BLOOD PRESSURE: 73 MMHG

## 2018-06-17 VITALS — SYSTOLIC BLOOD PRESSURE: 139 MMHG | HEART RATE: 131 BPM | DIASTOLIC BLOOD PRESSURE: 65 MMHG

## 2018-06-17 VITALS — OXYGEN SATURATION: 100 % | HEART RATE: 88 BPM

## 2018-06-17 VITALS
RESPIRATION RATE: 18 BRPM | OXYGEN SATURATION: 100 % | DIASTOLIC BLOOD PRESSURE: 66 MMHG | HEART RATE: 109 BPM | SYSTOLIC BLOOD PRESSURE: 141 MMHG

## 2018-06-17 VITALS — HEART RATE: 91 BPM

## 2018-06-17 VITALS — HEART RATE: 125 BPM | RESPIRATION RATE: 20 BRPM

## 2018-06-17 VITALS — SYSTOLIC BLOOD PRESSURE: 134 MMHG | DIASTOLIC BLOOD PRESSURE: 64 MMHG | HEART RATE: 132 BPM

## 2018-06-17 VITALS — HEART RATE: 124 BPM

## 2018-06-17 VITALS — SYSTOLIC BLOOD PRESSURE: 111 MMHG | DIASTOLIC BLOOD PRESSURE: 62 MMHG | HEART RATE: 91 BPM

## 2018-06-17 VITALS
SYSTOLIC BLOOD PRESSURE: 143 MMHG | OXYGEN SATURATION: 100 % | DIASTOLIC BLOOD PRESSURE: 63 MMHG | RESPIRATION RATE: 18 BRPM | HEART RATE: 130 BPM

## 2018-06-17 VITALS — SYSTOLIC BLOOD PRESSURE: 151 MMHG | DIASTOLIC BLOOD PRESSURE: 63 MMHG | HEART RATE: 122 BPM

## 2018-06-17 VITALS — RESPIRATION RATE: 16 BRPM | HEART RATE: 98 BPM

## 2018-06-17 VITALS — DIASTOLIC BLOOD PRESSURE: 59 MMHG | HEART RATE: 115 BPM | SYSTOLIC BLOOD PRESSURE: 119 MMHG

## 2018-06-17 VITALS — HEART RATE: 94 BPM | OXYGEN SATURATION: 99 %

## 2018-06-17 VITALS — SYSTOLIC BLOOD PRESSURE: 152 MMHG | HEART RATE: 106 BPM | DIASTOLIC BLOOD PRESSURE: 73 MMHG

## 2018-06-17 VITALS — HEART RATE: 163 BPM | DIASTOLIC BLOOD PRESSURE: 51 MMHG | SYSTOLIC BLOOD PRESSURE: 109 MMHG

## 2018-06-17 VITALS — OXYGEN SATURATION: 100 % | HEART RATE: 128 BPM

## 2018-06-17 VITALS — HEART RATE: 127 BPM

## 2018-06-17 VITALS — HEART RATE: 84 BPM

## 2018-06-17 VITALS — OXYGEN SATURATION: 100 % | HEART RATE: 91 BPM

## 2018-06-17 VITALS — HEART RATE: 99 BPM | RESPIRATION RATE: 16 BRPM | OXYGEN SATURATION: 100 %

## 2018-06-17 VITALS — HEART RATE: 101 BPM | OXYGEN SATURATION: 100 %

## 2018-06-17 VITALS — HEART RATE: 91 BPM | OXYGEN SATURATION: 100 %

## 2018-06-17 VITALS — TEMPERATURE: 99.3 F

## 2018-06-17 VITALS — OXYGEN SATURATION: 100 % | HEART RATE: 86 BPM

## 2018-06-17 VITALS — HEART RATE: 111 BPM | DIASTOLIC BLOOD PRESSURE: 59 MMHG | SYSTOLIC BLOOD PRESSURE: 126 MMHG

## 2018-06-17 VITALS — HEART RATE: 129 BPM

## 2018-06-17 VITALS — OXYGEN SATURATION: 100 % | HEART RATE: 99 BPM

## 2018-06-17 VITALS — HEART RATE: 94 BPM | OXYGEN SATURATION: 100 %

## 2018-06-17 VITALS — OXYGEN SATURATION: 100 % | HEART RATE: 100 BPM

## 2018-06-17 VITALS — TEMPERATURE: 98.8 F

## 2018-06-17 VITALS — SYSTOLIC BLOOD PRESSURE: 128 MMHG | HEART RATE: 100 BPM | DIASTOLIC BLOOD PRESSURE: 63 MMHG

## 2018-06-17 VITALS — HEART RATE: 123 BPM | TEMPERATURE: 99.6 F

## 2018-06-17 VITALS — DIASTOLIC BLOOD PRESSURE: 72 MMHG | HEART RATE: 126 BPM | SYSTOLIC BLOOD PRESSURE: 155 MMHG

## 2018-06-17 VITALS — DIASTOLIC BLOOD PRESSURE: 63 MMHG | HEART RATE: 127 BPM | SYSTOLIC BLOOD PRESSURE: 149 MMHG

## 2018-06-17 VITALS — HEART RATE: 128 BPM

## 2018-06-17 VITALS — RESPIRATION RATE: 18 BRPM

## 2018-06-17 VITALS — RESPIRATION RATE: 18 BRPM | SYSTOLIC BLOOD PRESSURE: 127 MMHG | DIASTOLIC BLOOD PRESSURE: 76 MMHG | HEART RATE: 102 BPM

## 2018-06-17 VITALS
RESPIRATION RATE: 20 BRPM | SYSTOLIC BLOOD PRESSURE: 158 MMHG | TEMPERATURE: 98.4 F | HEART RATE: 131 BPM | OXYGEN SATURATION: 100 % | DIASTOLIC BLOOD PRESSURE: 70 MMHG

## 2018-06-17 VITALS — DIASTOLIC BLOOD PRESSURE: 65 MMHG | HEART RATE: 123 BPM | SYSTOLIC BLOOD PRESSURE: 145 MMHG

## 2018-06-17 VITALS — SYSTOLIC BLOOD PRESSURE: 147 MMHG | DIASTOLIC BLOOD PRESSURE: 74 MMHG | HEART RATE: 102 BPM

## 2018-06-17 VITALS — HEART RATE: 118 BPM | SYSTOLIC BLOOD PRESSURE: 145 MMHG | DIASTOLIC BLOOD PRESSURE: 83 MMHG

## 2018-06-17 VITALS — DIASTOLIC BLOOD PRESSURE: 58 MMHG | SYSTOLIC BLOOD PRESSURE: 110 MMHG | HEART RATE: 96 BPM

## 2018-06-17 VITALS — HEART RATE: 99 BPM

## 2018-06-17 VITALS — DIASTOLIC BLOOD PRESSURE: 81 MMHG | SYSTOLIC BLOOD PRESSURE: 143 MMHG | HEART RATE: 112 BPM

## 2018-06-17 VITALS — HEART RATE: 86 BPM | OXYGEN SATURATION: 100 %

## 2018-06-17 VITALS — HEART RATE: 112 BPM | OXYGEN SATURATION: 100 %

## 2018-06-17 VITALS
RESPIRATION RATE: 18 BRPM | DIASTOLIC BLOOD PRESSURE: 57 MMHG | HEART RATE: 122 BPM | OXYGEN SATURATION: 100 % | SYSTOLIC BLOOD PRESSURE: 140 MMHG

## 2018-06-17 VITALS — HEART RATE: 140 BPM | RESPIRATION RATE: 20 BRPM

## 2018-06-17 VITALS — RESPIRATION RATE: 16 BRPM | HEART RATE: 82 BPM

## 2018-06-17 VITALS — HEART RATE: 104 BPM

## 2018-06-17 VITALS — SYSTOLIC BLOOD PRESSURE: 113 MMHG | HEART RATE: 81 BPM | DIASTOLIC BLOOD PRESSURE: 50 MMHG

## 2018-06-17 VITALS — HEART RATE: 114 BPM

## 2018-06-17 VITALS — DIASTOLIC BLOOD PRESSURE: 70 MMHG | HEART RATE: 113 BPM | SYSTOLIC BLOOD PRESSURE: 98 MMHG

## 2018-06-17 VITALS — SYSTOLIC BLOOD PRESSURE: 125 MMHG | HEART RATE: 119 BPM | DIASTOLIC BLOOD PRESSURE: 59 MMHG

## 2018-06-17 VITALS — HEART RATE: 103 BPM

## 2018-06-17 VITALS — HEART RATE: 80 BPM

## 2018-06-17 VITALS — OXYGEN SATURATION: 100 % | HEART RATE: 93 BPM

## 2018-06-17 VITALS — HEART RATE: 118 BPM

## 2018-06-17 VITALS — DIASTOLIC BLOOD PRESSURE: 63 MMHG | HEART RATE: 106 BPM | SYSTOLIC BLOOD PRESSURE: 133 MMHG

## 2018-06-17 VITALS — HEART RATE: 126 BPM | TEMPERATURE: 100 F | RESPIRATION RATE: 20 BRPM

## 2018-06-17 VITALS — HEART RATE: 82 BPM | OXYGEN SATURATION: 100 %

## 2018-06-17 VITALS — RESPIRATION RATE: 20 BRPM | HEART RATE: 122 BPM

## 2018-06-17 VITALS — HEART RATE: 103 BPM | RESPIRATION RATE: 18 BRPM

## 2018-06-17 VITALS — TEMPERATURE: 99 F | RESPIRATION RATE: 20 BRPM

## 2018-06-17 VITALS — HEART RATE: 82 BPM | SYSTOLIC BLOOD PRESSURE: 106 MMHG | DIASTOLIC BLOOD PRESSURE: 48 MMHG

## 2018-06-17 VITALS — RESPIRATION RATE: 18 BRPM | HEART RATE: 119 BPM

## 2018-06-17 VITALS — HEART RATE: 114 BPM | DIASTOLIC BLOOD PRESSURE: 78 MMHG | SYSTOLIC BLOOD PRESSURE: 147 MMHG

## 2018-06-17 VITALS — RESPIRATION RATE: 18 BRPM | DIASTOLIC BLOOD PRESSURE: 57 MMHG | SYSTOLIC BLOOD PRESSURE: 105 MMHG | HEART RATE: 108 BPM

## 2018-06-17 VITALS — SYSTOLIC BLOOD PRESSURE: 133 MMHG | DIASTOLIC BLOOD PRESSURE: 72 MMHG | HEART RATE: 118 BPM

## 2018-06-17 VITALS — HEART RATE: 122 BPM

## 2018-06-17 VITALS — HEART RATE: 84 BPM | RESPIRATION RATE: 18 BRPM

## 2018-06-17 VITALS — HEART RATE: 120 BPM

## 2018-06-17 VITALS — OXYGEN SATURATION: 100 % | HEART RATE: 89 BPM

## 2018-06-17 VITALS — HEART RATE: 126 BPM

## 2018-06-17 VITALS — SYSTOLIC BLOOD PRESSURE: 138 MMHG | HEART RATE: 122 BPM | DIASTOLIC BLOOD PRESSURE: 65 MMHG

## 2018-06-17 VITALS — HEART RATE: 98 BPM | OXYGEN SATURATION: 100 %

## 2018-06-17 VITALS — RESPIRATION RATE: 18 BRPM | TEMPERATURE: 98.7 F

## 2018-06-17 VITALS — SYSTOLIC BLOOD PRESSURE: 134 MMHG | HEART RATE: 89 BPM | DIASTOLIC BLOOD PRESSURE: 58 MMHG

## 2018-06-17 VITALS — OXYGEN SATURATION: 100 % | HEART RATE: 85 BPM

## 2018-06-17 VITALS — HEART RATE: 94 BPM

## 2018-06-17 VITALS — HEART RATE: 117 BPM | DIASTOLIC BLOOD PRESSURE: 91 MMHG | SYSTOLIC BLOOD PRESSURE: 140 MMHG

## 2018-06-17 VITALS — HEART RATE: 89 BPM

## 2018-06-17 VITALS — HEART RATE: 121 BPM

## 2018-06-17 VITALS — SYSTOLIC BLOOD PRESSURE: 102 MMHG | HEART RATE: 73 BPM | DIASTOLIC BLOOD PRESSURE: 47 MMHG

## 2018-06-17 VITALS — HEART RATE: 102 BPM

## 2018-06-17 VITALS — HEART RATE: 85 BPM

## 2018-06-17 VITALS — RESPIRATION RATE: 20 BRPM | HEART RATE: 127 BPM

## 2018-06-17 VITALS — RESPIRATION RATE: 22 BRPM

## 2018-06-17 VITALS — HEART RATE: 97 BPM

## 2018-06-17 VITALS — HEART RATE: 95 BPM

## 2018-06-17 VITALS — HEART RATE: 115 BPM

## 2018-06-17 VITALS — HEART RATE: 134 BPM

## 2018-06-17 VITALS — HEART RATE: 86 BPM

## 2018-06-17 VITALS — DIASTOLIC BLOOD PRESSURE: 87 MMHG | HEART RATE: 126 BPM | SYSTOLIC BLOOD PRESSURE: 125 MMHG

## 2018-06-17 VITALS — HEART RATE: 111 BPM

## 2018-06-17 VITALS — HEART RATE: 135 BPM

## 2018-06-17 VITALS — HEART RATE: 96 BPM

## 2018-06-17 VITALS — HEART RATE: 112 BPM

## 2018-06-17 VITALS — RESPIRATION RATE: 20 BRPM

## 2018-06-17 VITALS — HEART RATE: 82 BPM | RESPIRATION RATE: 16 BRPM

## 2018-06-17 VITALS — HEART RATE: 110 BPM

## 2018-06-17 LAB
BACTERIA #/AREA URNS HPF: (no result) /HPF
BASOPHILS # BLD AUTO: 0.1 TH/MM3 (ref 0–0.2)
BASOPHILS NFR BLD: 0.6 % (ref 0–2)
COLOR UR: YELLOW
EOSINOPHIL # BLD: 0.1 TH/MM3 (ref 0–0.4)
EOSINOPHIL NFR BLD: 1.1 % (ref 0–4)
ERYTHROCYTE [DISTWIDTH] IN BLOOD BY AUTOMATED COUNT: 16.5 % (ref 11.6–17.2)
GLUCOSE UR STRIP-MCNC: (no result) MG/DL
HCT VFR BLD CALC: 34.5 % (ref 35–46)
HGB BLD-MCNC: 10.9 GM/DL (ref 11.6–15.3)
HGB UR QL STRIP: (no result)
KETONES UR STRIP-MCNC: (no result) MG/DL
LYMPHOCYTES # BLD AUTO: 3 TH/MM3 (ref 1–4.8)
LYMPHOCYTES NFR BLD AUTO: 22.5 % (ref 9–44)
MCH RBC QN AUTO: 23.1 PG (ref 27–34)
MCHC RBC AUTO-ENTMCNC: 31.7 % (ref 32–36)
MCV RBC AUTO: 72.9 FL (ref 80–100)
MONOCYTE #: 1.2 TH/MM3 (ref 0–0.9)
MONOCYTES NFR BLD: 9.4 % (ref 0–8)
MUCOUS THREADS #/AREA URNS LPF: (no result) /LPF
NEUTROPHILS # BLD AUTO: 8.7 TH/MM3 (ref 1.8–7.7)
NEUTROPHILS NFR BLD AUTO: 66.4 % (ref 16–70)
NITRITE UR QL STRIP: (no result)
PLATELET # BLD: 267 TH/MM3 (ref 150–450)
PMV BLD AUTO: 8.2 FL (ref 7–11)
RBC # BLD AUTO: 4.74 MIL/MM3 (ref 4–5.3)
SP GR UR STRIP: 1.03 (ref 1–1.03)
SQUAMOUS #/AREA URNS HPF: 2 /HPF (ref 0–5)
URINE LEUKOCYTE ESTERASE: (no result)
WBC # BLD AUTO: 13.1 TH/MM3 (ref 4–11)

## 2018-06-17 PROCEDURE — 00HU33Z INSERTION OF INFUSION DEVICE INTO SPINAL CANAL, PERCUTANEOUS APPROACH: ICD-10-PCS

## 2018-06-17 PROCEDURE — 3E0R3BZ INTRODUCTION OF ANESTHETIC AGENT INTO SPINAL CANAL, PERCUTANEOUS APPROACH: ICD-10-PCS

## 2018-06-17 RX ADMIN — OXYCODONE HYDROCHLORIDE AND ACETAMINOPHEN PRN TAB: 5; 325 TABLET ORAL at 22:04

## 2018-06-17 RX ADMIN — OXYTOCIN SCH MLS/HR: 10 INJECTION, SOLUTION INTRAMUSCULAR; INTRAVENOUS at 06:25

## 2018-06-17 RX ADMIN — OXYTOCIN SCH MLS/HR: 10 INJECTION, SOLUTION INTRAMUSCULAR; INTRAVENOUS at 14:40

## 2018-06-17 RX ADMIN — OXYTOCIN SCH MLS/HR: 10 INJECTION, SOLUTION INTRAMUSCULAR; INTRAVENOUS at 01:16

## 2018-06-17 NOTE — HHI.HP
History & Physical


H&P


Patient Name: Renetta Jasmine Unit Number: [id number]


YOB: 1995 Patient Status: Admitted Inpatient


Attending Doctor: Madalyn Jasmine MD Account Number: [id number]


   


 HPI 


HPI


Chief Complaint


Contractions


Travel History


International Travel<30 Days:  No


Contact w/Intl Traveler<30Days:  No


Known Affected Area:  No





History of Present Illness


HPI


23-year-old , IUP at 39.2





Prenatal care complicated by obesity, history of PPROM with loss at 22-23 weeks

, history of SGA delivery, trichomonas, late prenatal care, ascus Pap, 

marijuana use





The patient presents complaining of the onset of painful contractions at about 

830 tonight.  She repeat reports they have increased in intensity and frequency 

since that time.  There are no aggravating or alleviating factors and no 

attempted treatments.  She reports that she is feeling extremely painful 

contractions every 10-15 minutes.  She reports good fetal movement.  She denies 

any leaking of fluid or vaginal bleeding.


Weeks Gestation:  39


Para:  1


:  3





 History (Limited) 


History


Past Medical History


*** Narrative Medical


Obesity





Obstetric History


Obstetric History





 1 4 lbs. 15 oz. at 37 weeks


P PROM at 22-23 weeks with demise/stillborn





Past Surgical History


Surgical History:  No Previous Surgery





Family History


*** Narrative Family History


DM, liver disease





Social History


Alcohol Use:  No


Tobacco Use:  No (DC 2 years ago)


Substance Abuse:  Yes (Lexi)





 Allergies-Medications 


Allergies-Medications


(Allergen,Severity, Reaction):  


Coded Allergies:  


     *MDRO Multi-Drug Resistant Organism (Verified  Allergy, Unknown, 18)


 MRSA 


     No Known Allergies (Verified  Allergy, Unknown, 18)


Home Meds


Active Scripts


Prenatal Vit-Iron Carbonyl (Prenatal Plus Iron 29-1 mg) 29 Mg Iron-1 Mg Tab, 1 

TAB PO DAILY for Nutritional Supplement, #30 TAB 0 Refills


   Prov:Dane Mi MD R1         18


Reported Medications


Ferrous Sulfate (Ferrous Sulfate) 325 Mg (65 Mg Iron) Tablet, 325 MG PO BIDPC 

for Nutritional Supplement, #60 TAB 0 Refills


   18


Discontinued Scripts


Miconazole 3 Vaginal Supp (Miconazole 3 Vaginal Supp) 200 Mg Supp, 200 MG 

VAGINAL HS for Infection for 3 Days, #3 SUPP 0 Refills


   Prov:Dane Mi MD R1         18


Acetaminophen (Tylenol) 325 Mg Tab, 650 MG PO Q6H Y for PAIN SCALE 1 TO 4, #20 

TAB 0 Refills


   Prov:Shital Tee DO         18


Nitrofurantoin Monohydrate Macrocrystals (Macrobid) 100 Mg Cap, 100 MG PO BID 

for Infection for 7 Days, #14 CAP 0 Refills


   Prov:Shital Tee DO         18





 ROS 


Review of Systems


Except as stated in HPI:  all other systems reviewed are Neg





 Physical Exam 


Physical Exam


Narrative


GENERAL: Well-nourished, well-developed patient.


SKIN: Warm and dry.


HEAD: Normocephalic and atraumatic.


EYES: No scleral icterus. No injection or drainage. 


ENT: No nasal drainage noted. Mucous membranes pink. Airway patent.


NECK: Supple, trachea midline. No JVD.


CARDIOVASCULAR: Regular rate and rhythm without murmurs, gallops, or rubs. 


RESPIRATORY: Breath sounds equal bilaterally. No accessory muscle use.


BREASTS: Bilateral exam showed no masses , no retractions, no nipple discharge.


ABDOMEN/GI: Abdomen soft, non-tender, bowel sounds present, no rebound, no 

guarding 


   Gravid 


GENITOURINARY: 


   External Genitalia: intact and normal in appearance.  Normal BUS.  No 

cervical or vaginal masses noted.  SVE 4-5/80/-2 with a stretchy cervix.  

Physiologic discharge, grossly normal rugate


FHT's: Fetal heart tones are in the


EXTREMITIES: No cyanosis or edema.


BACK: Nontender without obvious deformity. No CVA tenderness.


NEUROLOGICAL/musculoskeletal: Awake and alert. Motor and sensory grossly within 

normal limits.  Grossly normal muscle strength in all muscle groups. Normal 

speech.  Grossly normal range of motion, gait not assessed


Psychiatric: Grossly normal memory and affect





 Data 


Data





 [facility]


Plan


Assessment/plan:


1.  IUP at 39.2


2.  Active labor: We will admit the patient for labor at term.  Will manage 

expectantly at this time.  Discussed the risks of  including shoulder 

dystocia which is an obstetrical emergency and has potential risks of the 

 and mother as well as the risks and indications of  section.  

We discussed the risks that include pain, infection, bleeding, injury to other 

organs like bladder/bowel/nerves/vessels, injury to the baby, need for repeat 

operation, need for hysterectomy, need for blood transfusion, wound infection/

breakdown, and other possible complications.  We discussed that this baby is 

likely larger than her previous however per ultrasound report with a normal 

weight.  Ultrasound on 5/15/18 was estimated fetal weight 2407 g, 5 lbs. 4 oz.  

All the patient's questions were answered and we will proceed with admission.


3.  Obesity


4.  History of marijuana use: UDS tonight


5.  GBS negative


6.  history of PPROM with loss at 22-23 weeks


7.  history of SGA delivery, 


8.  trichomonas


9.  late prenatal care


10. ascus Pap


11. Rh negative











Madalyn Jasmine MD 2018 23:52











Madalyn Jasmine MD 2018 00:25

## 2018-06-17 NOTE — MP
cc:

Jose Farfan MD

****

 

 

DATE OF OPERATION:

2018

 

PREOPERATIVE DIAGNOSES:

Term intrauterine pregnancy, failure to progress and nonreassuring 

fetal heart rate tracing.

 

POSTOPERATIVE DIAGNOSES:

Term intrauterine pregnancy, failure to progress and nonreassuring 

fetal heart rate tracing.

 

PROCEDURE PERFORMED:

Primary low transverse  section.

 

SURGEON:

Jose Farfan MD

 

ANESTHESIA:

Epidural.

 

INDICATIONS FOR PROCEDURE:

The patient is a 23-year-old black female, G3, P1, A1, at 39 weeks, 

who presented with contractions in the late evening.  She was 

augmented and progressed to approximately 8 cm, where she had a 

failure to progress and was not able to run Pitocin adequately due to 

a nonreassuring fetal heart rate tracing with repetitive late 

decelerations.  When she would contract more than every other minute 

or so, she quit she would decelerate, so we cannot use Pitocin 

effectively and she made no progress after 7-8 cm.  She was watched 

for several hours and made no progress and it is felt that failure to 

progress was noted and the nonreassuring fetal heart rate tracing 

moved us to a transabdominal delivery.

 

DESCRIPTION OF PROCEDURE:

The patient was taken to the operating room and placed supine position

on the operating table.  Adequate epidural anesthesia, she was prepped

and draped for abdominal surgery.  A Pfannenstiel incision was made in

the lower abdomen and carried to fascia sharply.  The fascia was taken

off the rectus muscle, the rectus split in the midline.  The 

peritoneal cavity entered sharply.  The incision extended superior and

inferiorly.  The bladder was placed on the bladder blade and the 

visceral peritoneum reflected off the lower uterine segment and placed

on the bladder blade as well.  The transverse hysterotomy was made, 

extended bluntly bilaterally and a large amount of clear fluid noted 

at that time.  The infant was delivered at 2:58 p.m. from a vertex 

presentation, Apgars 9 and 9, weight 3325 grams.  It was a female.  

The cord pH 7.32.  There were no complications.  Delayed cord clamping

was done and the cord blood was obtained.  Also, the placenta was 

cultured on its maternal and fetal surfaces and the endometrial 

cultures done as well.  The placenta was sent to pathology.  The 

hysterotomy was closed in a running layer of 0 Chromic, followed by 

imbricating suture same.  Hemostasis achieved with several stick ties 

of chromic.  The bladder was reapproximated with a running stitch of 

2-0 Vicryl.  The uterus elevated, noted to have some filmy adhesions 

of the omentum, probably from old pelvic infections.  These adhesions 

were lysed with Bovie cautery without difficulty.  The uterus is 

elevated.  Ovaries were normal.  Tubes are normal.  Blood suctioned to

cul-de-sac gutters.  The uterus was replaced _____ cavity.  The 

parietal peritoneum closed with running layer 2-0 Vicryl.  Muscle 

reapproximated with stick ties of Chromic.  Fascia closed in running 

layer of 0 Vicryl.  Subcutaneous tissues also brought together with 

2-0 plain catgut suture in a running fashion.  Skin closed with 3-0 

Monocryl subcuticular stitch.  A pressure dressing applied.  There 

were no complications.  Sponge and needle correct x 2.

 

ESTIMATED BLOOD LOSS:

500 mL.

 

DISPOSITION:

The patient to recovery in stable condition.

 

 

__________________________________

MD ANNALISA Mcmillan/BLAIR

D: 2018, 03:42 PM

T: 2018, 05:23 PM

Visit #: [id number]

Job #: [id number]

## 2018-06-17 NOTE — HHI.PR
Subjective


Remarks


OBHG


S:  patient comfortable, resting


O:  VSS AF


[place]:  q 1-2 minutes


FHR:  160s, moderate LTV, good accels, see below


SVE:  8/80/-2


A/P:  


1.  IUP at 39.3


2.  Active labor: expectant management for now


3.  GBS negative


4.  Rh negative


5.  Obesity


6.  FHR currently reassuring: will monitor closely. Overnight the patient had a 

6-7 minute FHR deceleration at 0355 with drop to , the patient was repositioned 

with spontaneous resolution. The patient had a second deceleration to the 100s-

110s for 3-4 minutes at 0615.  I was notified and presented to bedside for a 

deceleration at 640 that was 3-4 minutes total with drop to 110s  Each 

deceleration has been associated with either q 1 minute contractions, a tetanic 

contraction, or q 1 minute contractions without apparent return of uterine tone 

to baseline. Patient was examined, given 0.25mg terbutaline, Oxygen administered

, and an IVF bolus ordered.  After verbal consent for internal monitoring, an 

IUPC was placed without difficulty and a FSE placed.  Will administer 

amnioinfusion and continue oxygen for now. Discussed with the patient that 

should she continue having decelerations, we would proceed with  

delivery.  All of her questions were answered and the patient is in agreement 

with the plan. We will continue to monitor closely.





Objective





Vital Signs








  Date Time  Temp Pulse Resp B/P (MAP) Pulse Ox O2 Delivery O2 Flow Rate FiO2


 


18 06:20   16     


 


18 06:16  88  108/73 (85)    


 


18 06:15  99   100   


 


18 06:15   16     


 


18 06:10  101   100   


 


18 06:05  98   100   


 


18 06:01  96  110/58 (75)    


 


18 06:00   18     


 


18 06:00  99   100   


 


18 06:00 98.2       


 


18 05:55  101   100   


 


18 05:50  99      


 


18 05:50     100   


 


18 05:46  91  111/62 (78)    


 


18 05:45  83      


 


18 05:45   18  100   


 


18 05:40     100   


 


6/17/18 05:40  82      


 


6/17/18 05:35  86      


 


6/17/18 05:35     100   


 


6/17/18 05:31  76  112/56 (74)    


 


6/17/18 05:30   18  100   


 


6/17/18 05:30  88      


 


6/17/18 05:16  82  106/48 (67)    


 


617/18 05:15   18     


 


6/17/18 05:15  84      


 


6/17/18 05:10  85      


 


6/17/18 05:05  80      


 


6/17/18 05:01  84  106/55 (72)    


 


617/18 05:00  82 16     


 


6/17/18 04:55  86      


 


6/17/18 04:50  84      


 


6/17/18 04:46  81  113/50 (71)    


 


617/18 04:45  88 16     


 


6/17/18 04:40  92      


 


6/17/18 04:35  79      


 


6/17/18 04:31  73  102/47 (65)    


 


617/18 04:30  82 16     


 


6/17/18 04:25  80      


 


6/17/18 04:23  86  112/55 (74)    


 


6/17/18 04:20  93   100   


 


6/17/18 04:19  89  134/58 (83)    


 


6/17/18 04:00 98.8       


 


6/17/18 03:55  94   99   


 


6/17/18 03:50  93   100   


 


6/17/18 03:45  86   100   


 


6/17/18 03:40  116   100   


 


6/17/18 03:35  88   100   


 


6/17/18 03:30  94   100   


 


6/17/18 03:25  91   100   


 


6/17/18 03:20  89   100   


 


6/17/18 03:15  87   100   


 


6/17/18 03:10  88   100   


 


6/17/18 03:05  91   100   


 


6/17/18 03:00  85   100   


 


6/17/18 02:55  100   100   


 


6/17/18 02:50  91   100   


 


6/17/18 02:45  89   100   


 


6/17/18 02:33   18     


 


6/17/18 02:30  89      


 


6/17/18 02:25  91      


 


6/17/18 02:20  98      


 


6/17/18 02:15  94      


 


6/17/18 02:15  104  137/93 (108)    


 


6/17/18 02:10  95      


 


6/17/18 02:05  93      


 


6/17/18 02:01  99  116/66 (83)    


 


6/18 02:00  99      


 


6/17/18 01:55  94      


 


6/17/18 01:50  90      


 


6/17/18 01:45   16     


 


6/17/18 01:45  90  122/66 (84)    


 


17/18 01:45  92      


 


6/17/18 01:40  97      


 


617/18 01:35  102      


 


17/18 01:31  163  109/51 (70)    


 


17/18 01:30  98      


 


6/17/18 01:30   16     


 


6/17/18 01:25  93      


 


6/17/18 01:20  92      


 


6/17/18 01:19  100  128/63 (84)    


 


/18 01:16  99  120/65 (83)    


 


/18 01:15   18     


 


/18 01:15  96      


 


/18 01:10  106      


 


/18 01:10  106  133/63 (86)    


 


/18 01:08  111  126/59 (81)    


 


/18 01:05  99      


 


/18 01:01  102  147/74 (98)    


 


/18 01:00  114      


 


/18 01:00   18     


 


/18 00:55  107      


 


/18 00:55  106  152/73 (99)    


 


/18 00:51  112  143/81 (101)    


 


17/18 00:50  112      


 


17/18 00:49  114  147/78 (101)    


 


/18 00:46  104  150/80 (103)    


 


17/18 00:45  103      


 


6/17/18 00:45   18     


 


17/18 00:40  112   100   


 


617/18 00:39  112  150/84 (106)    


 


/18 00:35  128   100   


 


17/18 00:30  132   100   


 


617/18 00:25  119      


 


617/18 00:24  118  160/90 (113)    








Result Diagram:  


18 0010














Madalyn Jasmine MD 2018 06:52

## 2018-06-18 VITALS
DIASTOLIC BLOOD PRESSURE: 71 MMHG | SYSTOLIC BLOOD PRESSURE: 126 MMHG | RESPIRATION RATE: 20 BRPM | TEMPERATURE: 98.8 F | HEART RATE: 112 BPM

## 2018-06-18 VITALS
DIASTOLIC BLOOD PRESSURE: 58 MMHG | TEMPERATURE: 98.4 F | RESPIRATION RATE: 18 BRPM | HEART RATE: 106 BPM | SYSTOLIC BLOOD PRESSURE: 101 MMHG

## 2018-06-18 VITALS
TEMPERATURE: 98.4 F | RESPIRATION RATE: 18 BRPM | DIASTOLIC BLOOD PRESSURE: 61 MMHG | OXYGEN SATURATION: 100 % | SYSTOLIC BLOOD PRESSURE: 105 MMHG | HEART RATE: 111 BPM

## 2018-06-18 VITALS
OXYGEN SATURATION: 100 % | TEMPERATURE: 98.4 F | SYSTOLIC BLOOD PRESSURE: 105 MMHG | HEART RATE: 111 BPM | RESPIRATION RATE: 18 BRPM | DIASTOLIC BLOOD PRESSURE: 61 MMHG

## 2018-06-18 VITALS
OXYGEN SATURATION: 98 % | HEART RATE: 118 BPM | TEMPERATURE: 98.5 F | SYSTOLIC BLOOD PRESSURE: 98 MMHG | RESPIRATION RATE: 20 BRPM | DIASTOLIC BLOOD PRESSURE: 75 MMHG

## 2018-06-18 VITALS
RESPIRATION RATE: 20 BRPM | TEMPERATURE: 98.6 F | SYSTOLIC BLOOD PRESSURE: 119 MMHG | DIASTOLIC BLOOD PRESSURE: 64 MMHG | OXYGEN SATURATION: 99 % | HEART RATE: 109 BPM

## 2018-06-18 VITALS
SYSTOLIC BLOOD PRESSURE: 126 MMHG | HEART RATE: 112 BPM | RESPIRATION RATE: 20 BRPM | TEMPERATURE: 98.8 F | DIASTOLIC BLOOD PRESSURE: 71 MMHG

## 2018-06-18 VITALS
TEMPERATURE: 98.7 F | HEART RATE: 108 BPM | RESPIRATION RATE: 18 BRPM | SYSTOLIC BLOOD PRESSURE: 108 MMHG | DIASTOLIC BLOOD PRESSURE: 60 MMHG

## 2018-06-18 VITALS
SYSTOLIC BLOOD PRESSURE: 123 MMHG | HEART RATE: 115 BPM | DIASTOLIC BLOOD PRESSURE: 72 MMHG | TEMPERATURE: 98.9 F | RESPIRATION RATE: 20 BRPM

## 2018-06-18 VITALS
SYSTOLIC BLOOD PRESSURE: 101 MMHG | HEART RATE: 112 BPM | DIASTOLIC BLOOD PRESSURE: 69 MMHG | TEMPERATURE: 98.9 F | RESPIRATION RATE: 20 BRPM | OXYGEN SATURATION: 99 %

## 2018-06-18 VITALS
SYSTOLIC BLOOD PRESSURE: 126 MMHG | RESPIRATION RATE: 20 BRPM | TEMPERATURE: 98.8 F | OXYGEN SATURATION: 99 % | HEART RATE: 112 BPM | DIASTOLIC BLOOD PRESSURE: 71 MMHG

## 2018-06-18 LAB
BASOPHILS # BLD AUTO: 0 TH/MM3 (ref 0–0.2)
BASOPHILS NFR BLD: 0.2 % (ref 0–2)
EOSINOPHIL # BLD: 0.1 TH/MM3 (ref 0–0.4)
EOSINOPHIL NFR BLD: 0.6 % (ref 0–4)
ERYTHROCYTE [DISTWIDTH] IN BLOOD BY AUTOMATED COUNT: 16.8 % (ref 11.6–17.2)
HCT VFR BLD CALC: 22.2 % (ref 35–46)
HCT VFR BLD CALC: 27.9 % (ref 35–46)
HGB BLD-MCNC: 7.1 GM/DL (ref 11.6–15.3)
HGB BLD-MCNC: 9 GM/DL (ref 11.6–15.3)
LYMPHOCYTES # BLD AUTO: 2.2 TH/MM3 (ref 1–4.8)
LYMPHOCYTES NFR BLD AUTO: 13 % (ref 9–44)
MCH RBC QN AUTO: 23.5 PG (ref 27–34)
MCHC RBC AUTO-ENTMCNC: 31.8 % (ref 32–36)
MCV RBC AUTO: 73.8 FL (ref 80–100)
MONOCYTE #: 1.5 TH/MM3 (ref 0–0.9)
MONOCYTES NFR BLD: 9 % (ref 0–8)
NEUTROPHILS # BLD AUTO: 12.9 TH/MM3 (ref 1.8–7.7)
NEUTROPHILS NFR BLD AUTO: 77.2 % (ref 16–70)
PLATELET # BLD: 199 TH/MM3 (ref 150–450)
PMV BLD AUTO: 8.3 FL (ref 7–11)
RBC # BLD AUTO: 3 MIL/MM3 (ref 4–5.3)
WBC # BLD AUTO: 16.8 TH/MM3 (ref 4–11)

## 2018-06-18 PROCEDURE — 30233N1 TRANSFUSION OF NONAUTOLOGOUS RED BLOOD CELLS INTO PERIPHERAL VEIN, PERCUTANEOUS APPROACH: ICD-10-PCS | Performed by: OBSTETRICS & GYNECOLOGY

## 2018-06-18 RX ADMIN — IBUPROFEN PRN MG: 600 TABLET, FILM COATED ORAL at 04:46

## 2018-06-18 RX ADMIN — OXYTOCIN SCH MLS/HR: 10 INJECTION, SOLUTION INTRAMUSCULAR; INTRAVENOUS at 10:40

## 2018-06-18 RX ADMIN — CEFAZOLIN SODIUM SCH MLS/HR: 2 SOLUTION INTRAVENOUS at 00:06

## 2018-06-18 RX ADMIN — IBUPROFEN PRN MG: 600 TABLET, FILM COATED ORAL at 16:59

## 2018-06-18 RX ADMIN — OXYCODONE HYDROCHLORIDE AND ACETAMINOPHEN PRN TAB: 5; 325 TABLET ORAL at 06:20

## 2018-06-18 RX ADMIN — OXYTOCIN SCH MLS/HR: 10 INJECTION, SOLUTION INTRAMUSCULAR; INTRAVENOUS at 04:00

## 2018-06-18 RX ADMIN — Medication SCH ML: at 09:00

## 2018-06-18 RX ADMIN — OXYCODONE HYDROCHLORIDE AND ACETAMINOPHEN PRN TAB: 5; 325 TABLET ORAL at 02:12

## 2018-06-18 RX ADMIN — OXYCODONE HYDROCHLORIDE AND ACETAMINOPHEN PRN TAB: 5; 325 TABLET ORAL at 11:22

## 2018-06-18 RX ADMIN — OXYCODONE HYDROCHLORIDE AND ACETAMINOPHEN PRN TAB: 5; 325 TABLET ORAL at 16:58

## 2018-06-18 RX ADMIN — OXYCODONE HYDROCHLORIDE AND ACETAMINOPHEN PRN TAB: 5; 325 TABLET ORAL at 21:12

## 2018-06-18 RX ADMIN — IBUPROFEN PRN MG: 600 TABLET, FILM COATED ORAL at 11:25

## 2018-06-18 RX ADMIN — CEFAZOLIN SODIUM SCH MLS/HR: 2 SOLUTION INTRAVENOUS at 07:17

## 2018-06-18 NOTE — HHI.OB
Subjective


Remarks


Postoperative day number 1. Patient was tachycardic overnight in the 100's, all 

other VS wnl.  Pain controlled with medications. Incision not draining. 

Decreased lochia.  Denies dysuria.  No breast tenderness.  Appetite good.  No 

nausea or vomiting.  Endorses flatus.  No bowel movement.  Ambulating well but 

did report some dizziness. Denies calf pain, shortness of breath, or cough.  

Otherwise, she is doing well this morning and has no other complaints.





Objective


Vitals/I&O





Vital Signs








  Date Time  Temp Pulse Resp B/P (MAP) Pulse Ox O2 Delivery O2 Flow Rate FiO2


 


6/18/18 04:51 98.7 108 18 108/60 (76)    


 


6/18/18 00:00 98.4       


 


6/18/18 00:00  106 18 101/58 (72)    


 


6/17/18 21:00 99.3       


 


6/17/18 21:00  109 16 124/62 (82)    


 


6/17/18 17:25  108 18 105/57 (73)    


 


6/17/18 16:45  111 18 114/58 (76) 100   


 


6/17/18 16:30  109 18 141/66 (91) 100   


 


6/17/18 16:15  122 18 140/57 (84) 100   


 


6/17/18 16:00  130  143/63 (89) 100   


 


6/17/18 16:00   18     


 


6/17/18 15:45     100   


 


6/17/18 15:45  131 20 158/70 (99)    


 


6/17/18 15:45 98.4       


 


6/17/18 14:15   18     


 


6/17/18 14:10  128      


 


6/17/18 14:05  130      


 


6/17/18 14:01  109  121/49 (73)    


 


6/17/18 14:00   18     


 


6/17/18 14:00  119      


 


6/17/18 13:55  115      


 


6/17/18 13:50  118      


 


6/17/18 13:46  131  155/71 (99)    


 


6/17/18 13:45  123      


 


6/17/18 13:45 99.6       


 


6/17/18 13:40  121      


 


6/17/18 13:35  120      


 


6/17/18 13:31  114  149/68 (95)    


 


6/17/18 13:30  120      


 


6/17/18 13:25  119      


 


6/17/18 13:20  120      


 


6/17/18 13:16  114  144/75 (98)    


 


6/17/18 13:15  118      


 


6/17/18 13:10  118      


 


6/17/18 13:05  118      


 


6/17/18 13:01  119  138/81 (100)    


 


6/17/18 13:00   18     


 


6/17/18 13:00  121      


 


6/17/18 12:59 99.3       


 


6/17/18 12:55  120      


 


6/17/18 12:54  118  145/83 (103)    


 


6/17/18 12:50  121      


 


6/17/18 12:46  126  155/72 (99)    


 


6/17/18 12:45  124      


 


6/17/18 12:40  129      


 


6/17/18 12:35  123      


 


6/17/18 12:31  122  138/65 (89)    


 


6/17/18 12:30  122 20     


 


6/17/18 12:25  126      


 


6/17/18 12:20  122      


 


6/17/18 12:16  126  125/87 (100)    


 


6/17/18 12:15  123      


 


6/17/18 12:10  129      


 


6/17/18 12:05  135      


 


6/17/18 12:01  133  144/91 (108)    


 


6/17/18 12:00   20     


 


6/17/18 12:00  140      


 


6/17/18 11:50  111      


 


6/17/18 11:46  113  98/70 (79)    


 


6/17/18 11:45  108      


 


6/17/18 11:40   18     


 


6/17/18 11:35  103      


 


6/17/18 11:31  103  105/68 (80)    


 


6/17/18 11:30  96      


 


6/17/18 10:56 99.0  20     


 


6/17/18 10:46  132  134/64 (87)    


 


6/17/18 10:31  119  125/59 (81)    


 


6/17/18 10:16  129  144/53 (83)    


 


6/17/18 10:10  134      


 


6/17/18 10:01  127  149/63 (91)    


 


6/17/18 10:00   20     


 


6/17/18 10:00  125      








Objective Remarks


GENERAL: Well-nourished, well-developed patient.


CARDIOVASCULAR: Tachycardic to the 100's, regular rhythm without murmurs, 

gallops, or rubs. 


RESPIRATORY: Breath sounds equal bilaterally. No accessory muscle use.


ABDOMEN/GI: Abdomen soft, non-tender. 


   Fundus: Firm, non-tender at umbilicus.


GENITOURINARY: Light to moderate bleeding.


EXTREMITIES: No cyanosis or edema, non-tender, without signs of DVT.


Medications and IVs





Current Medications








 Medications


  (Trade)  Dose


 Ordered  Sig/Olga


 Route  Start Time


 Stop Time Status Last Admin


 


 Lactated Ringer's  1,000 ml @ 


 3,000 mls/hr  Q20M PRN


 IV  6/17/18 00:00


    6/17/18 07:41


 


 


 Sodium Chloride  500 ml @ 


 1,000 mls/hr  ONCE  PRN


 IV  6/17/18 00:00


     


 


 


 Sodium Chloride  1,000 ml @ 


 100 mls/hr  Q10H PRN


 IV  6/17/18 00:20


     


 


 


  (Xylocaine 1%


 Inj (50 ml))  0.1 ml  UNSCH X1  PRN


 I-DERMAL  6/17/18 00:00


 6/19/18 23:59   


 


 


  (Bicitra Liq)  30 ml  ON  CALL


 PO  6/17/18 00:00


 6/20/18 23:59   


 


 


  (Xylocaine 1%


 Inj (50 ml))  10 ml  UNSCH X1  PRN


 INFIL  6/17/18 00:00


 6/18/18 23:59   


 


 


  (Muri-Lube Oil)  10 ml  UNSCH  PRN


 TOPICAL  6/17/18 00:00


     


 


 


 Fentanyl/


 Bupivacaine/


 Sodium Chlor  150 ml @ 0


 mls/hr  TITRATE  PRN


 EPIDURAL  6/17/18 01:15


     


 


 


 Oxytocin  500 ml @ 0


 mls/hr  TITRATE  PRN


 IV  6/17/18 09:15


     


 


 


 Lactated Ringer's  1,000 ml @ 


 150 mls/hr  Q6H40M


 IV  6/17/18 14:40


    6/17/18 14:40


 


 


 Cefazolin Sodium/


 Dextrose  50 ml @ 


 100 mls/hr  ON  CALL


 IV  6/17/18 15:15


 6/21/18 15:14  6/17/18 18:56


 


 


  (Bicitra Liq)  30 ml  ON  CALL


 PO  6/17/18 15:45


 6/21/18 15:44   


 


 


 Lactated Ringer's  1,000 ml @ 


 100 mls/hr  Q10H


 IV  6/17/18 20:42


 6/18/18 16:41   


 


 


 Oxytocin  500 ml @ 


 100 mls/hr  UNSCH X1  PRN


 IV  6/17/18 20:45


 6/18/18 20:44   


 


 


  (NS Flush)  2 ml  BID


 IV FLUSH  6/17/18 21:00


     


 


 


  (NS Flush)  2 ml  UNSCH  PRN


 IV FLUSH  6/17/18 15:45


     


 


 


  (Mylicon Chew)  80 mg  QID  PRN


 PO  6/17/18 15:45


     


 


 


  (Tylenol)  650 mg  Q6H  PRN


 PO  6/17/18 15:45


     


 


 


  (Motrin)  600 mg  Q6H  PRN


 PO  6/17/18 15:45


    6/18/18 04:46


 


 


  (Toradol Inj)  60 mg  UNSCH X1  PRN


 IM  6/17/18 15:45


 6/18/18 15:44  6/17/18 22:04


 


 


  (Percocet  5-325


 Mg)  1 tab  Q4H  PRN


 PO  6/17/18 15:45


     


 


 


  (Percocet  5-325


 Mg)  2 tab  Q4H  PRN


 PO  6/17/18 15:45


    6/18/18 06:20


 


 


  (Jessica-Colace)  2 tab  Q12H  PRN


 PO  6/17/18 15:45


     


 


 


  (Ambien)  5 mg  HS  PRN


 PO  6/17/18 15:45


     


 


 


  (M-M-R Ii Inj)  0.5 ml  ONCE ONCE


 SQ  6/18/18 16:00


 6/18/18 16:01   


 


 


  (Boostrix Inj)  0.5 ml  ONCE ONCE


 IM  6/18/18 16:00


 6/18/18 16:01   


 


 


  (Zofran  Odt)  4 mg  Q6H  PRN


 PO  6/17/18 15:45


     


 


 


  (Misc Nursing


 Information)  NO SYSTEMIC


 NARCOTICS TO BE


 GIVEN FO...  UNSCH  PRN


 .XX  6/17/18 17:45


 6/18/18 17:44   


 


 


  (Narcan Inj)  0.4 mg  UNSCH  PRN


 IV PUSH  6/17/18 17:45


 6/18/18 17:44   


 


 


  (Benadryl Inj)  25 mg  Q6H  PRN


 IV PUSH  6/17/18 17:45


 6/18/18 17:44   


 


 


  (Benadryl)  50 mg  Q6H  PRN


 PO  6/17/18 17:45


 6/18/18 17:44   


 


 


  (Misc Nursing


 Information)  ALL


 NURSING


 DEPARTMENTS  UNSCH  PRN


 .XX  6/17/18 17:45


 6/18/18 17:44   


 


 


 Sodium Chloride  250 ml @ 


 15 mls/hr  ONCE  ONCE


 IV  6/18/18 07:45


 6/19/18 00:24   


 


 


  (Benadryl)  25 mg  Q4H  PRN


 PO  6/18/18 07:45


    6/18/18 09:47


 











Assessment/Plan


Assessment and Plan


24 y/o female who is POD# 1 s/p CXN. 


-Post op H/H decreased at 7.1/22.2. Patient symptomatic with dizziness, will 

transfuse 2 units. 


-Continue routine postpartum care. 


-Tylenol and Motrin PRN pain.


-Encouraged OOB. Advised pelvic rest for 6 wks. Will need a f/u appt. in 1 wk 

for incision check. 


-Re: will arrange with OB as outpatient


-D/c in 1-2 days











Jerrell Helton MD R1 Jun 18, 2018 09:59

## 2018-06-19 VITALS
HEART RATE: 96 BPM | DIASTOLIC BLOOD PRESSURE: 71 MMHG | TEMPERATURE: 98.2 F | SYSTOLIC BLOOD PRESSURE: 109 MMHG | RESPIRATION RATE: 20 BRPM

## 2018-06-19 VITALS
DIASTOLIC BLOOD PRESSURE: 78 MMHG | TEMPERATURE: 99.2 F | RESPIRATION RATE: 20 BRPM | HEART RATE: 120 BPM | OXYGEN SATURATION: 96 % | SYSTOLIC BLOOD PRESSURE: 126 MMHG

## 2018-06-19 VITALS — HEART RATE: 80 BPM

## 2018-06-19 LAB
ERYTHROCYTE [DISTWIDTH] IN BLOOD BY AUTOMATED COUNT: 17.3 % (ref 11.6–17.2)
HCT VFR BLD CALC: 27.7 % (ref 35–46)
HGB BLD-MCNC: 9 GM/DL (ref 11.6–15.3)
MCH RBC QN AUTO: 24.6 PG (ref 27–34)
MCHC RBC AUTO-ENTMCNC: 32.4 % (ref 32–36)
MCV RBC AUTO: 75.8 FL (ref 80–100)
PLATELET # BLD: 217 TH/MM3 (ref 150–450)
PMV BLD AUTO: 8.2 FL (ref 7–11)
RBC # BLD AUTO: 3.66 MIL/MM3 (ref 4–5.3)
WBC # BLD AUTO: 15.7 TH/MM3 (ref 4–11)

## 2018-06-19 RX ADMIN — IBUPROFEN SCH MG: 800 TABLET, FILM COATED ORAL at 22:08

## 2018-06-19 RX ADMIN — OXYCODONE HYDROCHLORIDE AND ACETAMINOPHEN PRN TAB: 5; 325 TABLET ORAL at 14:01

## 2018-06-19 RX ADMIN — OXYCODONE HYDROCHLORIDE AND ACETAMINOPHEN PRN TAB: 5; 325 TABLET ORAL at 22:09

## 2018-06-19 RX ADMIN — OXYTOCIN SCH MLS/HR: 10 INJECTION, SOLUTION INTRAMUSCULAR; INTRAVENOUS at 06:40

## 2018-06-19 RX ADMIN — STANDARDIZED SENNA CONCENTRATE AND DOCUSATE SODIUM PRN TAB: 8.6; 5 TABLET, FILM COATED ORAL at 08:01

## 2018-06-19 RX ADMIN — SIMETHICONE CHEW TAB 80 MG PRN MG: 80 TABLET ORAL at 22:09

## 2018-06-19 RX ADMIN — SULFAMETHOXAZOLE AND TRIMETHOPRIM SCH TAB: 800; 160 TABLET ORAL at 11:57

## 2018-06-19 RX ADMIN — OXYCODONE HYDROCHLORIDE AND ACETAMINOPHEN PRN TAB: 5; 325 TABLET ORAL at 09:50

## 2018-06-19 RX ADMIN — IBUPROFEN PRN MG: 600 TABLET, FILM COATED ORAL at 08:01

## 2018-06-19 RX ADMIN — SULFAMETHOXAZOLE AND TRIMETHOPRIM SCH TAB: 800; 160 TABLET ORAL at 22:08

## 2018-06-19 RX ADMIN — IBUPROFEN PRN MG: 600 TABLET, FILM COATED ORAL at 01:25

## 2018-06-19 RX ADMIN — OXYCODONE HYDROCHLORIDE AND ACETAMINOPHEN PRN TAB: 5; 325 TABLET ORAL at 06:05

## 2018-06-19 RX ADMIN — Medication SCH ML: at 21:00

## 2018-06-19 RX ADMIN — SIMETHICONE CHEW TAB 80 MG PRN MG: 80 TABLET ORAL at 15:03

## 2018-06-19 RX ADMIN — STANDARDIZED SENNA CONCENTRATE AND DOCUSATE SODIUM PRN TAB: 8.6; 5 TABLET, FILM COATED ORAL at 22:08

## 2018-06-19 RX ADMIN — OXYTOCIN SCH MLS/HR: 10 INJECTION, SOLUTION INTRAMUSCULAR; INTRAVENOUS at 20:00

## 2018-06-19 RX ADMIN — OXYCODONE HYDROCHLORIDE AND ACETAMINOPHEN PRN TAB: 5; 325 TABLET ORAL at 18:28

## 2018-06-19 RX ADMIN — OXYCODONE HYDROCHLORIDE AND ACETAMINOPHEN PRN TAB: 5; 325 TABLET ORAL at 01:25

## 2018-06-19 RX ADMIN — IBUPROFEN SCH MG: 800 TABLET, FILM COATED ORAL at 14:01

## 2018-06-19 NOTE — HHI.OB
Subjective


Remarks


Patient is a 23-year-old  delivered at 39 weeks and 2 days. Patient is 

postpartum day 2 after . Patient's pain is well-controlled but she 

does continue to complain of needing assistance with ambulating. Patient 

reports eating and drinking without any nausea or vomiting. Patient reports 

minimal bleeding. Patient has passed gas.  She complains of urinary frequency 

and pain with urination.  Patient is walking without lower extremity pain or 

shortness of breath. Patient reports desire for contraception arranged as 

outpatient.





Objective


Vitals/I&O





Vital Signs








  Date Time  Temp Pulse Resp B/P (MAP) Pulse Ox O2 Delivery O2 Flow Rate FiO2


 


18 08:00 99.2 120 20 126/78 (94) 96   


 


18 19:42 98.9 115 20 123/72 (89)    


 


18 15:53 98.6 109 20 119/64 99   


 


18 15:28 98.4 111 18 105/61 100   


 


18 15:25 98.4 111 18 105/61 (76) 100   


 


18 11:14 98.5 118 20 98/75 98   


 


18 11:00 98.9 112 20 101/69 99   


 


18 10:45 98.8 112 20 126/71    


 


18 10:40 98.8 112 20 126/71 (89)    


 


18 10:40     99   


 


18 10:32 98.8 112 20 126/71    








Objective Remarks


GENERAL: Well-nourished, well-developed patient.


CARDIOVASCULAR: Tachycardic to the 100's, regular rhythm without murmurs, 

gallops, or rubs. 


RESPIRATORY: Breath sounds equal bilaterally. No accessory muscle use.


ABDOMEN/GI: Abdomen soft, non-tender. 


   Fundus: Firm, non-tender at umbilicus.


GENITOURINARY: Light to moderate bleeding.


EXTREMITIES: No cyanosis or edema, non-tender, without signs of DVT.


Medications and IVs





Current Medications








 Medications


  (Trade)  Dose


 Ordered  Sig/Olga


 Route  Start Time


 Stop Time Status Last Admin


 


 Lactated Ringer's  1,000 ml @ 


 3,000 mls/hr  Q20M PRN


 IV  18 00:00


    18 07:41


 


 


 Sodium Chloride  500 ml @ 


 1,000 mls/hr  ONCE  PRN


 IV  18 00:00


     


 


 


 Sodium Chloride  1,000 ml @ 


 100 mls/hr  Q10H PRN


 IV  18 00:20


     


 


 


  (Xylocaine 1%


 Inj (50 ml))  0.1 ml  UNSCH X1  PRN


 I-DERMAL  18 00:00


 18 23:59   


 


 


  (Bicitra Liq)  30 ml  ON  CALL


 PO  18 00:00


 18 23:59   


 


 


  (Muri-Lube Oil)  10 ml  UNSCH  PRN


 TOPICAL  18 00:00


     


 


 


 Fentanyl/


 Bupivacaine/


 Sodium Chlor  150 ml @ 0


 mls/hr  TITRATE  PRN


 EPIDURAL  18 01:15


     


 


 


 Oxytocin  500 ml @ 0


 mls/hr  TITRATE  PRN


 IV  18 09:15


     


 


 


 Lactated Ringer's  1,000 ml @ 


 150 mls/hr  Q6H40M


 IV  18 14:40


    18 14:40


 


 


 Cefazolin Sodium/


 Dextrose  50 ml @ 


 100 mls/hr  ON  CALL


 IV  18 15:15


 18 15:14  18 18:56


 


 


  (Bicitra Liq)  30 ml  ON  CALL


 PO  18 15:45


 18 15:44   


 


 


  (NS Flush)  2 ml  BID


 IV FLUSH  18 21:00


     


 


 


  (NS Flush)  2 ml  UNSCH  PRN


 IV FLUSH  18 15:45


     


 


 


  (Mylicon Chew)  80 mg  QID  PRN


 PO  18 15:45


     


 


 


  (Tylenol)  650 mg  Q6H  PRN


 PO  18 15:45


     


 


 


  (Motrin)  600 mg  Q6H  PRN


 PO  18 15:45


    18 08:01


 


 


  (Percocet  5-325


 Mg)  1 tab  Q4H  PRN


 PO  18 15:45


     


 


 


  (Percocet  5-325


 Mg)  2 tab  Q4H  PRN


 PO  18 15:45


    18 06:05


 


 


  (Jessica-Colace)  2 tab  Q12H  PRN


 PO  18 15:45


    18 08:01


 


 


  (Ambien)  5 mg  HS  PRN


 PO  18 15:45


     


 


 


  (Zofran  Odt)  4 mg  Q6H  PRN


 PO  18 15:45


     


 


 


 Ceftriaxone


 Sodium 1000 mg/


 Sodium Chloride  100 ml @ 


 200 mls/hr  Q24H


 IV  18 08:30


   UNV  


 











Assessment/Plan


Assessment and Plan


24 y/o female who is POD# 2 s/p CXN. 


-Post op H/H decreased at 7.1/22.2.  Patient was transfused 2 units with an 

increase H/H to 9.1/28.4


-UA on admission with large leukocyte esterase, 44 WBC.  Cultures growing mixed 

clarita.  Will treat with 1 g Rocephin IV every 24 hours as patient is 

symptomatic with urinary frequency and dysuria.


-Continue routine postpartum care. 


-Percocet and Motrin PRN pain.


-Encouraged OOB. Advised pelvic rest for 6 wks. Will need a f/u appt. in 1 wk 

for incision check. 


-Re: will arrange with OB as outpatient


-D/c likely tomorrow











Jerrell Helton MD R1 2018 08:21

## 2018-06-20 RX ADMIN — STANDARDIZED SENNA CONCENTRATE AND DOCUSATE SODIUM PRN TAB: 8.6; 5 TABLET, FILM COATED ORAL at 10:00

## 2018-06-20 RX ADMIN — SULFAMETHOXAZOLE AND TRIMETHOPRIM SCH TAB: 800; 160 TABLET ORAL at 10:00

## 2018-06-20 RX ADMIN — OXYTOCIN SCH MLS/HR: 10 INJECTION, SOLUTION INTRAMUSCULAR; INTRAVENOUS at 01:57

## 2018-06-20 RX ADMIN — OXYCODONE HYDROCHLORIDE AND ACETAMINOPHEN PRN TAB: 5; 325 TABLET ORAL at 02:10

## 2018-06-20 RX ADMIN — OXYCODONE HYDROCHLORIDE AND ACETAMINOPHEN PRN TAB: 5; 325 TABLET ORAL at 10:01

## 2018-06-20 RX ADMIN — OXYCODONE HYDROCHLORIDE AND ACETAMINOPHEN PRN TAB: 5; 325 TABLET ORAL at 05:58

## 2018-06-20 RX ADMIN — IBUPROFEN SCH MG: 800 TABLET, FILM COATED ORAL at 05:58

## 2018-06-20 NOTE — HHI.DCPOC
Discharge Care Plan


Diagnosis:  


(1) UTI (urinary tract infection)


(2)  delivery delivered


Report Symptoms to Your Doctor


-Temperature above 100.5 degrees


-Redness, of incision or excessive or foul smelling drainage


-Unusual pain or calf pain


-Increased vaginal bleeding


-Painful or difficulty urinating


-Feelings of extreme sadness or anxiety after 2 weeks


Goals to Promote Your Health


* To prevent worsening of your condition and complications


* To maintain your health at the optimal level


Directions to Meet Your Goals


*** Take your medications as prescribed


*** Follow your dietary instruction


*** Follow activity as directed


*** Ensure plenty of rest for recovery


*** Drink fluids for hydration








*** Keep your appointments as scheduled


*** Take your immunizations and boosters as scheduled


*** If your symptoms worsen call your PCP, if no PCP go to Urgent Care Center 

or Emergency Room***


*** Smoking is Dangerous to Your Health. Avoid second hand smoke***


***Call the 24-hour crisis hotline for domestic abuse at 1-197.172.8959***











Jerrell Helton MD R1 2018 08:08

## 2018-06-20 NOTE — HHI.OB
Subjective


Remarks


Patient is a 23-year-old  delivered at 39 weeks and 2 days. Patient is 

postpartum day 3 after . Patient's pain is well-controlled and patient 

is ambulating much better today. Patient reports eating and drinking without 

any nausea or vomiting. Patient reports minimal bleeding. Patient has passed 

gas and bowel movements. Patient is walking without lower extremity pain or 

shortness of breath.  Urinary symptoms improving on Bactrim





Objective


Vitals/I&O





Vital Signs








  Date Time  Temp Pulse Resp B/P (MAP) Pulse Ox O2 Delivery O2 Flow Rate FiO2


 


18 21:07 98.2 96 20 109/71 (84)    


 


18 15:00  80      








Objective Remarks


GENERAL: Well-nourished, well-developed patient.


CARDIOVASCULAR: Tachycardic to the 100's, regular rhythm without murmurs, 

gallops, or rubs. 


RESPIRATORY: Breath sounds equal bilaterally. No accessory muscle use.


ABDOMEN/GI: Abdomen soft, non-tender. 


   Fundus: Firm, non-tender at umbilicus.


GENITOURINARY: Light to moderate bleeding.


EXTREMITIES: No cyanosis or edema, non-tender, without signs of DVT.


Medications and IVs





Current Medications








 Medications


  (Trade)  Dose


 Ordered  Sig/Olga


 Route  Start Time


 Stop Time Status Last Admin


 


 Lactated Ringer's  1,000 ml @ 


 3,000 mls/hr  Q20M PRN


 IV  18 00:00


    18 07:41


 


 


 Sodium Chloride  500 ml @ 


 1,000 mls/hr  ONCE  PRN


 IV  18 00:00


     


 


 


 Sodium Chloride  1,000 ml @ 


 100 mls/hr  Q10H PRN


 IV  18 00:20


     


 


 


  (Bicitra Liq)  30 ml  ON  CALL


 PO  18 00:00


 18 23:59   


 


 


  (Muri-Lube Oil)  10 ml  UNSCH  PRN


 TOPICAL  18 00:00


     


 


 


 Fentanyl/


 Bupivacaine/


 Sodium Chlor  150 ml @ 0


 mls/hr  TITRATE  PRN


 EPIDURAL  18 01:15


     


 


 


 Oxytocin  500 ml @ 0


 mls/hr  TITRATE  PRN


 IV  18 09:15


     


 


 


 Lactated Ringer's  1,000 ml @ 


 150 mls/hr  Q6H40M


 IV  18 14:40


    18 14:40


 


 


 Cefazolin Sodium/


 Dextrose  50 ml @ 


 100 mls/hr  ON  CALL


 IV  18 15:15


 18 15:14  18 18:56


 


 


  (Bicitra Liq)  30 ml  ON  CALL


 PO  18 15:45


 18 15:44   


 


 


  (NS Flush)  2 ml  BID


 IV FLUSH  18 21:00


     


 


 


  (NS Flush)  2 ml  UNSCH  PRN


 IV FLUSH  18 15:45


     


 


 


  (Mylicon Chew)  80 mg  QID  PRN


 PO  18 15:45


    18 22:09


 


 


  (Tylenol)  650 mg  Q6H  PRN


 PO  18 15:45


     


 


 


  (Jessica-Colace)  2 tab  Q12H  PRN


 PO  18 15:45


    18 22:08


 


 


  (Ambien)  5 mg  HS  PRN


 PO  18 15:45


     


 


 


  (Zofran  Odt)  4 mg  Q6H  PRN


 PO  18 15:45


     


 


 


  (Percocet  5-325


 Mg)  1 tab  Q4H  PRN


 PO  18 10:00


    18 05:58


 


 


  (Percocet  5-325


 Mg)  2 tab  Q4H  PRN


 PO  18 10:00


    18 02:10


 


 


  (Motrin)  800 mg  Q8HR


 PO  18 14:00


    18 05:58


 


 


  (Bactrim Ds


 800-160 Mg)  1 tab  Q12HR


 PO  18 11:00


    18 22:08


 











Assessment/Plan


Assessment and Plan


24 y/o female who is POD# 3 s/p CXN. 


-Post op H/H decreased at 7.1/22.2.  Patient was transfused 2 units with an 

increase H/H to 9.1/28.4


-UA on admission with large leukocyte esterase, 44 WBC.  Cultures growing mixed 

clarita.  Started double strength Bactrim p.o. on .  Symptoms improving.  

Will DC to complete 3 day course


-Continue routine postpartum care. 


-Percocet and Motrin PRN pain.


-Encouraged OOB. Advised pelvic rest for 6 wks. Will need a f/u appt. in 1 wk 

for incision check. 


-Re: will arrange with OB as outpatient


-D/c today











Jerrell Helton MD R1 2018 08:10

## 2022-04-17 NOTE — PD
HPI


Chief Complaint:  Pregnancy Related Problem


Time Seen by Provider:  23:26


Travel History


International Travel<30 days:  No


Contact w/Intl Traveler<30days:  No


Traveled to known affect area:  No





History of Present Illness


HPI


21yo F with no PMD or PSH presents to the ED with c/o lower abdominal cramping, 

dysuria for 2 days. +White vaginal discharge.  Denies any fever, chest pain, n/v

, vaginal bleeding . Pt was seen here 1/3/18 and was diagnose with cystitis and 

given a prescription of macrobid.  Pt said she did not fill her medication.  At 

that time, bedside US was completed and showed an IUP >10 weeks.  Pt does not 

know her LMP.  Did not take anything for pain at home.





PFSH


Past Medical History


Medical History:  Denies Significant Hx


Developmental Delay:  No


Diminished Hearing:  No


Immunizations Current:  Yes


Pregnant?:  Pregnant


:  3


Para:  1


Miscarriage:  1





Past Surgical History


Surgical History:  No Previous Surgery





Social History


Alcohol Use:  No


Tobacco Use:  Yes (black n milds- 2/DAY)


Substance Use:  No





Allergies-Medications


(Allergen,Severity, Reaction):  


Coded Allergies:  


     *MDRO Multi-Drug Resistant Organism (Verified  Allergy, Unknown, 18)


 MRSA 


Reported Meds & Prescriptions





Reported Meds & Active Scripts


Active


No Active Prescriptions or Reported Medications    








Review of Systems


Except as stated in HPI:  all other systems reviewed are Neg





Physical Exam


Narrative


GENERAL: 21yo F in mild distress.


SKIN: Focused skin assessment warm/dry.


HEAD: Atraumatic. Normocephalic. . 


CARDIOVASCULAR: Regular rate and rhythm.  No murmur appreciated.


RESPIRATORY: No accessory muscle use. Clear to auscultation. Breath sounds 

equal bilaterally. 


GASTROINTESTINAL: Abdomen soft, gravid.  +TTP lower abdomen diffusely.  More 

suprapubic.  


PELVIC: +White thick vaginal discharge.  No blood.  No CMT or adnexal 

tenderness bilaterally.


MUSCULOSKELETAL: No obvious deformities. No clubbing.  No cyanosis.  No edema. 


NEUROLOGICAL: Awake and alert. No obvious cranial nerve deficits.  Motor 

grossly within normal limits. Normal speech.


PSYCHIATRIC: Appropriate mood and affect; insight and judgment normal.





Data


Data


Last Documented VS





Vital Signs








  Date Time  Temp Pulse Resp B/P (MAP) Pulse Ox O2 Delivery O2 Flow Rate FiO2


 


18 22:43 97.9 93 20 150/67 (94) 100 Room Air  








Orders





 Orders


Complete Blood Count With Diff (18 23:38)


Comprehensive Metabolic Panel (18 23:38)


Gc And Chlamydia Pcr (18 23:38)


Wet Prep Profile (18 23:38)


Urinalysis - C+S If Indicated (18 23:38)


Urine Culture (18 23:30)


Nitrofurantoin Monohyd Macrocr (Macrobid (18 00:45)


Azithromycin Powd Pack (Zithromax Powd P (18 02:30)


Ceftriaxone Inj (Rocephin Inj) (18 02:30)


Lidocaine 1% Inj (50 Ml) (Xylocaine 1% I (18 02:30)


Ed Poc Ultrasound (18 )





Labs





Laboratory Tests








Test


  18


23:30 18


23:50 18


00:00


 


Urine Color LIGHT-YELLOW   


 


Urine Turbidity HAZY   


 


Urine pH 7.0   


 


Urine Specific Gravity 1.008   


 


Urine Protein NEG mg/dL   


 


Urine Glucose (UA) NEG mg/dL   


 


Urine Ketones 10 mg/dL   


 


Urine Occult Blood NEG   


 


Urine Nitrite NEG   


 


Urine Bilirubin NEG   


 


Urine Urobilinogen


  LESS THAN 2.0


MG/DL 


  


 


 


Urine Leukocyte Esterase LARGE   


 


Urine RBC 9 /hpf   


 


Urine WBC 61 /hpf   


 


Urine Squamous Epithelial


Cells 5 /hpf 


  


  


 


 


Urine Amorphous Sediment RARE   


 


Urine Bacteria OCC /hpf   


 


Urine Mucus FEW /lpf   


 


Microscopic Urinalysis Comment


  CULTURE


INDICATED 


  


 


 


Clue Cells (Wet Prep)  NONE SEEN  


 


Vaginal Trichomonas (Wet Prep)  NONE SEEN  


 


Vaginal Yeast (Wet Prep)  NONE SEEN  


 


Chlamydia trachomatis DNA


(PCR) 


  DETECTED 


  


 


 


Neisseria gonorrhoeae DNA


(PCR) 


  NOT DETECTED 


  


 


 


White Blood Count   10.4 TH/MM3 


 


Red Blood Count   4.40 MIL/MM3 


 


Hemoglobin   10.6 GM/DL 


 


Hematocrit   32.1 % 


 


Mean Corpuscular Volume   72.9 FL 


 


Mean Corpuscular Hemoglobin   24.2 PG 


 


Mean Corpuscular Hemoglobin


Concent 


  


  33.1 % 


 


 


Red Cell Distribution Width   14.0 % 


 


Platelet Count   245 TH/MM3 


 


Mean Platelet Volume   8.3 FL 


 


Neutrophils (%) (Auto)   56.9 % 


 


Lymphocytes (%) (Auto)   31.9 % 


 


Monocytes (%) (Auto)   8.0 % 


 


Eosinophils (%) (Auto)   2.7 % 


 


Basophils (%) (Auto)   0.5 % 


 


Neutrophils # (Auto)   5.9 TH/MM3 


 


Lymphocytes # (Auto)   3.3 TH/MM3 


 


Monocytes # (Auto)   0.8 TH/MM3 


 


Eosinophils # (Auto)   0.3 TH/MM3 


 


Basophils # (Auto)   0.0 TH/MM3 


 


CBC Comment   DIFF FINAL 


 


Differential Comment    


 


Blood Urea Nitrogen   7 MG/DL 


 


Creatinine   0.50 MG/DL 


 


Random Glucose   76 MG/DL 


 


Total Protein   7.4 GM/DL 


 


Albumin   3.1 GM/DL 


 


Calcium Level   8.5 MG/DL 


 


Alkaline Phosphatase   86 U/L 


 


Aspartate Amino Transf


(AST/SGOT) 


  


  8 U/L 


 


 


Alanine Aminotransferase


(ALT/SGPT) 


  


  14 U/L 


 


 


Total Bilirubin   0.1 MG/DL 


 


Sodium Level   139 MEQ/L 


 


Potassium Level   3.4 MEQ/L 


 


Chloride Level   107 MEQ/L 


 


Carbon Dioxide Level   23.3 MEQ/L 


 


Anion Gap   9 MEQ/L 


 


Estimat Glomerular Filtration


Rate 


  


  187 ML/MIN 


 











MDM


Medical Decision Making


Medical Screen Exam Complete:  Yes


Emergency Medical Condition:  Yes


Differential Diagnosis


Cystitis vs. bacterial vaginosis vs. PID


Narrative Course


21yo F here with lower abdominal pain and vaginal discharge.  Pt given 

acetaminophen and pain has resolved.  Abdomen is soft, NT/ND.  Labs reviewed, 

no leukocytosis.  H/H low but at baseline.  CMP unremarkable.  UA showed large 

leukocyte.  WBC 61.  Pt given macrobid. Pt is well appearing and tolerating PO.

  Denies any fever.  Pt said she found out she does have insurance from 

registration so she will get her medication and follow up with OBGYN.  Wet prep 

negative.  Chlamydia detected.  Pt given azithromycin and ceftriaxone with 

lidocaine.  Bedside US showed IUP.  Return precautions given.





Procedures


**Procedure Narrative**


Emergency Department Pelvic ultrasound was performed with patient consent.


The curvilinear probe was used in the transverse and sagittal views within the 

suprapubic region revealing single intrauterine pregnancy.  Fetal heart rate 

was 152bpm.





Diagnosis





 Primary Impression:  


 Cystitis


 Additional Impression:  


 Chlamydia


Patient Instructions:  General Instructions


Departure Forms:  Tests/Procedures





***Additional Instructions:  


Please follow up with OBGYN in 2-3 days.  Return to the ED if symptoms worsen.


***Med/Other Pt SpecificInfo:  Prescription(s) given


Scripts


Acetaminophen (Tylenol) 325 Mg Tab


650 MG PO Q6H Y for PAIN SCALE 1 TO 4, #20 TAB 0 Refills


   Prov: Shital Tee DO         18 


Nitrofurantoin Monohydrate Macrocrystals (Macrobid) 100 Mg Cap


100 MG PO BID for Infection for 7 Days, #14 CAP 0 Refills


   Prov: Shital Tee DO         18


Disposition:  01 DISCHARGE HOME


Condition:  Stable











Shital Tee DO 2018 23:42 Patient